# Patient Record
Sex: MALE | Race: BLACK OR AFRICAN AMERICAN | NOT HISPANIC OR LATINO | URBAN - METROPOLITAN AREA
[De-identification: names, ages, dates, MRNs, and addresses within clinical notes are randomized per-mention and may not be internally consistent; named-entity substitution may affect disease eponyms.]

---

## 2019-03-25 ENCOUNTER — EMERGENCY (EMERGENCY)
Facility: HOSPITAL | Age: 34
LOS: 0 days | Discharge: HOME | End: 2019-03-25
Payer: MEDICAID

## 2019-03-25 VITALS
SYSTOLIC BLOOD PRESSURE: 153 MMHG | HEIGHT: 70 IN | TEMPERATURE: 98 F | OXYGEN SATURATION: 99 % | DIASTOLIC BLOOD PRESSURE: 83 MMHG | WEIGHT: 220.02 LBS | HEART RATE: 75 BPM | RESPIRATION RATE: 18 BRPM

## 2019-03-25 DIAGNOSIS — M79.671 PAIN IN RIGHT FOOT: ICD-10-CM

## 2019-03-25 PROCEDURE — 99283 EMERGENCY DEPT VISIT LOW MDM: CPT

## 2019-03-25 PROCEDURE — 73620 X-RAY EXAM OF FOOT: CPT | Mod: 26,RT

## 2019-03-25 RX ORDER — IBUPROFEN 200 MG
600 TABLET ORAL ONCE
Qty: 0 | Refills: 0 | Status: COMPLETED | OUTPATIENT
Start: 2019-03-25 | End: 2019-03-25

## 2019-03-25 RX ORDER — CEPHALEXIN 500 MG
1 CAPSULE ORAL
Qty: 14 | Refills: 0
Start: 2019-03-25 | End: 2019-03-31

## 2019-03-25 RX ORDER — OXYCODONE AND ACETAMINOPHEN 5; 325 MG/1; MG/1
1 TABLET ORAL ONCE
Qty: 0 | Refills: 0 | Status: DISCONTINUED | OUTPATIENT
Start: 2019-03-25 | End: 2019-03-25

## 2019-03-25 RX ADMIN — OXYCODONE AND ACETAMINOPHEN 1 TABLET(S): 5; 325 TABLET ORAL at 16:58

## 2019-03-25 RX ADMIN — OXYCODONE AND ACETAMINOPHEN 1 TABLET(S): 5; 325 TABLET ORAL at 17:05

## 2019-03-25 NOTE — ED ADULT NURSE NOTE - OBJECTIVE STATEMENT
Patient complains of abscess to the foot that started 2 months ago. Dry skin and swelling near site, not currently red.

## 2019-03-25 NOTE — ED PROVIDER NOTE - OBJECTIVE STATEMENT
34 y/o male with no PMH here c/o R foot pain and swelling x 2 days. pt states he had a scab in the foot and then started picking at it and now its painful. took motrin with mild relief. no fevers. pt has been able to bear weight just with pain. no change in sensation. pt otherwise has no other complaints.    ROS: No fever/chills. No eye pain/changes in vision, No ear pain/sore throat/dysphagia, No chest pain/palpitations. No SOB/cough/. No abdominal pain, N/V/D, no black/bloody bm. No dysuria/frequency/discharge, No headache. No Dizziness.  +scab to foot No numbness/tingling/weakness. 32 y/o male with no PMH here c/o R foot pain and swelling x 2 days. pt states he had a scab in the foot and then started picking at it and now its painful. took motrin with mild relief. no trauma. no fevers. pt has been able to bear weight just with pain. no change in sensation. pt otherwise has no other complaints.    ROS: No fever/chills. No eye pain/changes in vision, No ear pain/sore throat/dysphagia, No chest pain/palpitations. No SOB/cough/. No abdominal pain, N/V/D, no black/bloody bm. No dysuria/frequency/discharge, No headache. No Dizziness.  +scab to foot No numbness/tingling/weakness.

## 2019-03-25 NOTE — ED PROVIDER NOTE - PHYSICAL EXAMINATION
Gen: Alert, NAD, not toxic, well appearing  Head: NC, AT, PERRL, EOMI, normal lids/conjunctiva  ENT: B TM WNL, normal hearing, patent oropharynx without erythema/exudate, uvula midline  Neck: +supple, no tenderness/meningismus/JVD, +Trachea midline  Pulm: Bilateral BS, normal resp effort, no wheeze/stridor/retractions  CV: RRR, no M/R/G, +dist pulses  Abd: soft, NT/ND, +BS, no hepatosplenomegaly  Mskel: no erythema/cyanosis, R foot without bony tenderness, full rom, sensations intact, good pulses, able to wiggle all toes  Skin: area of darkened skin R medial foot with excoriations, no erythema or warmth,  Neuro: AAOx3, no sensory/motor deficits, CN 2-12 intact

## 2019-03-25 NOTE — ED PROVIDER NOTE - CLINICAL SUMMARY MEDICAL DECISION MAKING FREE TEXT BOX
pt here with R foot pain s/p picked at a scab, no fevers, xray neg for acute pathology, advised pt re importance of podiatry fu, pt understands, pain control, abx, ok with dc pt here with R foot pain s/p picked at a scab, no fevers, xray neg for acute pathology, advised pt re importance of podiatry fu, pt understands, pain control, abx, return precatuions given ok with dc

## 2019-08-28 ENCOUNTER — EMERGENCY (EMERGENCY)
Facility: HOSPITAL | Age: 34
LOS: 0 days | Discharge: ANOTHER HOSPITAL | End: 2019-08-28
Attending: EMERGENCY MEDICINE
Payer: MEDICAID

## 2019-08-28 ENCOUNTER — TRANSCRIPTION ENCOUNTER (OUTPATIENT)
Age: 34
End: 2019-08-28

## 2019-08-28 ENCOUNTER — RESULT REVIEW (OUTPATIENT)
Age: 34
End: 2019-08-28

## 2019-08-28 ENCOUNTER — OUTPATIENT (OUTPATIENT)
Dept: EMERGENCY DEPT | Facility: HOSPITAL | Age: 34
LOS: 1 days | Discharge: HOME | End: 2019-08-28
Payer: MEDICAID

## 2019-08-28 VITALS
TEMPERATURE: 98 F | RESPIRATION RATE: 17 BRPM | HEART RATE: 50 BPM | DIASTOLIC BLOOD PRESSURE: 73 MMHG | SYSTOLIC BLOOD PRESSURE: 116 MMHG | OXYGEN SATURATION: 97 %

## 2019-08-28 VITALS
SYSTOLIC BLOOD PRESSURE: 137 MMHG | RESPIRATION RATE: 17 BRPM | HEART RATE: 58 BPM | TEMPERATURE: 98 F | OXYGEN SATURATION: 98 % | DIASTOLIC BLOOD PRESSURE: 87 MMHG

## 2019-08-28 VITALS
DIASTOLIC BLOOD PRESSURE: 73 MMHG | WEIGHT: 231.04 LBS | SYSTOLIC BLOOD PRESSURE: 127 MMHG | RESPIRATION RATE: 16 BRPM | HEIGHT: 70 IN | HEART RATE: 84 BPM | OXYGEN SATURATION: 100 % | TEMPERATURE: 98 F

## 2019-08-28 VITALS
SYSTOLIC BLOOD PRESSURE: 129 MMHG | DIASTOLIC BLOOD PRESSURE: 87 MMHG | TEMPERATURE: 98 F | RESPIRATION RATE: 18 BRPM | OXYGEN SATURATION: 98 % | HEART RATE: 69 BPM

## 2019-08-28 DIAGNOSIS — T18.128A FOOD IN ESOPHAGUS CAUSING OTHER INJURY, INITIAL ENCOUNTER: ICD-10-CM

## 2019-08-28 DIAGNOSIS — T18.108A UNSPECIFIED FOREIGN BODY IN ESOPHAGUS CAUSING OTHER INJURY, INITIAL ENCOUNTER: ICD-10-CM

## 2019-08-28 DIAGNOSIS — T17.228A FOOD IN PHARYNX CAUSING OTHER INJURY, INITIAL ENCOUNTER: ICD-10-CM

## 2019-08-28 DIAGNOSIS — Z98.890 OTHER SPECIFIED POSTPROCEDURAL STATES: Chronic | ICD-10-CM

## 2019-08-28 DIAGNOSIS — Z29.9 ENCOUNTER FOR PROPHYLACTIC MEASURES, UNSPECIFIED: ICD-10-CM
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
## 2019-08-28 LAB
ALBUMIN SERPL ELPH-MCNC: 4 G/DL — SIGNIFICANT CHANGE UP (ref 3.3–5)
ALP SERPL-CCNC: 115 U/L — SIGNIFICANT CHANGE UP (ref 40–120)
ALT FLD-CCNC: 22 U/L — SIGNIFICANT CHANGE UP (ref 12–78)
ANION GAP SERPL CALC-SCNC: 8 MMOL/L — SIGNIFICANT CHANGE UP (ref 5–17)
APTT BLD: 29.6 SEC — SIGNIFICANT CHANGE UP (ref 27.5–36.3)
AST SERPL-CCNC: 17 U/L — SIGNIFICANT CHANGE UP (ref 15–37)
BILIRUB SERPL-MCNC: 0.4 MG/DL — SIGNIFICANT CHANGE UP (ref 0.2–1.2)
BUN SERPL-MCNC: 12 MG/DL — SIGNIFICANT CHANGE UP (ref 7–23)
CALCIUM SERPL-MCNC: 9.2 MG/DL — SIGNIFICANT CHANGE UP (ref 8.5–10.1)
CHLORIDE SERPL-SCNC: 107 MMOL/L — SIGNIFICANT CHANGE UP (ref 96–108)
CO2 SERPL-SCNC: 26 MMOL/L — SIGNIFICANT CHANGE UP (ref 22–31)
CREAT SERPL-MCNC: 1.07 MG/DL — SIGNIFICANT CHANGE UP (ref 0.5–1.3)
GLUCOSE SERPL-MCNC: 114 MG/DL — HIGH (ref 70–99)
HCT VFR BLD CALC: 41.2 % — SIGNIFICANT CHANGE UP (ref 39–50)
HGB BLD-MCNC: 13.7 G/DL — SIGNIFICANT CHANGE UP (ref 13–17)
INR BLD: 1.02 RATIO — SIGNIFICANT CHANGE UP (ref 0.88–1.16)
MCHC RBC-ENTMCNC: 30.3 PG — SIGNIFICANT CHANGE UP (ref 27–34)
MCHC RBC-ENTMCNC: 33.3 GM/DL — SIGNIFICANT CHANGE UP (ref 32–36)
MCV RBC AUTO: 91.2 FL — SIGNIFICANT CHANGE UP (ref 80–100)
NRBC # BLD: 0 /100 WBCS — SIGNIFICANT CHANGE UP (ref 0–0)
PLATELET # BLD AUTO: 193 K/UL — SIGNIFICANT CHANGE UP (ref 150–400)
POTASSIUM SERPL-MCNC: 3.6 MMOL/L — SIGNIFICANT CHANGE UP (ref 3.5–5.3)
POTASSIUM SERPL-SCNC: 3.6 MMOL/L — SIGNIFICANT CHANGE UP (ref 3.5–5.3)
PROT SERPL-MCNC: 7.7 GM/DL — SIGNIFICANT CHANGE UP (ref 6–8.3)
PROTHROM AB SERPL-ACNC: 11.4 SEC — SIGNIFICANT CHANGE UP (ref 10–12.9)
RBC # BLD: 4.52 M/UL — SIGNIFICANT CHANGE UP (ref 4.2–5.8)
RBC # FLD: 14.3 % — SIGNIFICANT CHANGE UP (ref 10.3–14.5)
SODIUM SERPL-SCNC: 141 MMOL/L — SIGNIFICANT CHANGE UP (ref 135–145)
WBC # BLD: 4.73 K/UL — SIGNIFICANT CHANGE UP (ref 3.8–10.5)
WBC # FLD AUTO: 4.73 K/UL — SIGNIFICANT CHANGE UP (ref 3.8–10.5)

## 2019-08-28 PROCEDURE — 70490 CT SOFT TISSUE NECK W/O DYE: CPT | Mod: 26

## 2019-08-28 PROCEDURE — 93010 ELECTROCARDIOGRAM REPORT: CPT

## 2019-08-28 PROCEDURE — 99285 EMERGENCY DEPT VISIT HI MDM: CPT

## 2019-08-28 PROCEDURE — 71045 X-RAY EXAM CHEST 1 VIEW: CPT | Mod: 26

## 2019-08-28 PROCEDURE — 70360 X-RAY EXAM OF NECK: CPT | Mod: 26

## 2019-08-28 RX ORDER — SODIUM CHLORIDE 9 MG/ML
1000 INJECTION INTRAMUSCULAR; INTRAVENOUS; SUBCUTANEOUS ONCE
Refills: 0 | Status: COMPLETED | OUTPATIENT
Start: 2019-08-28 | End: 2019-08-28

## 2019-08-28 RX ORDER — METOCLOPRAMIDE HCL 10 MG
10 TABLET ORAL ONCE
Refills: 0 | Status: COMPLETED | OUTPATIENT
Start: 2019-08-28 | End: 2019-08-28

## 2019-08-28 RX ORDER — GLUCAGON INJECTION, SOLUTION 0.5 MG/.1ML
0.5 INJECTION, SOLUTION SUBCUTANEOUS ONCE
Refills: 0 | Status: COMPLETED | OUTPATIENT
Start: 2019-08-28 | End: 2019-08-28

## 2019-08-28 RX ORDER — ACETAMINOPHEN 500 MG
1000 TABLET ORAL ONCE
Refills: 0 | Status: DISCONTINUED | OUTPATIENT
Start: 2019-08-28 | End: 2019-08-28

## 2019-08-28 RX ORDER — MORPHINE SULFATE 50 MG/1
4 CAPSULE, EXTENDED RELEASE ORAL ONCE
Refills: 0 | Status: DISCONTINUED | OUTPATIENT
Start: 2019-08-28 | End: 2019-08-28

## 2019-08-28 RX ORDER — DIPHENHYDRAMINE HCL 50 MG
25 CAPSULE ORAL ONCE
Refills: 0 | Status: COMPLETED | OUTPATIENT
Start: 2019-08-28 | End: 2019-08-28

## 2019-08-28 RX ORDER — NITROGLYCERIN 6.5 MG
0.4 CAPSULE, EXTENDED RELEASE ORAL ONCE
Refills: 0 | Status: COMPLETED | OUTPATIENT
Start: 2019-08-28 | End: 2019-08-28

## 2019-08-28 RX ORDER — GLUCAGON INJECTION, SOLUTION 0.5 MG/.1ML
1 INJECTION, SOLUTION SUBCUTANEOUS ONCE
Refills: 0 | Status: COMPLETED | OUTPATIENT
Start: 2019-08-28 | End: 2019-08-28

## 2019-08-28 RX ADMIN — GLUCAGON INJECTION, SOLUTION 0.5 MILLIGRAM(S): 0.5 INJECTION, SOLUTION SUBCUTANEOUS at 01:06

## 2019-08-28 RX ADMIN — SODIUM CHLORIDE 1000 MILLILITER(S): 9 INJECTION INTRAMUSCULAR; INTRAVENOUS; SUBCUTANEOUS at 02:32

## 2019-08-28 RX ADMIN — Medication 10 MILLIGRAM(S): at 04:24

## 2019-08-28 RX ADMIN — SODIUM CHLORIDE 1000 MILLILITER(S): 9 INJECTION INTRAMUSCULAR; INTRAVENOUS; SUBCUTANEOUS at 01:09

## 2019-08-28 RX ADMIN — SODIUM CHLORIDE 1000 MILLILITER(S): 9 INJECTION INTRAMUSCULAR; INTRAVENOUS; SUBCUTANEOUS at 04:24

## 2019-08-28 RX ADMIN — Medication 0.4 MILLIGRAM(S): at 05:48

## 2019-08-28 RX ADMIN — MORPHINE SULFATE 4 MILLIGRAM(S): 50 CAPSULE, EXTENDED RELEASE ORAL at 03:01

## 2019-08-28 RX ADMIN — Medication 25 MILLIGRAM(S): at 05:48

## 2019-08-28 RX ADMIN — MORPHINE SULFATE 4 MILLIGRAM(S): 50 CAPSULE, EXTENDED RELEASE ORAL at 02:45

## 2019-08-28 RX ADMIN — SODIUM CHLORIDE 1000 MILLILITER(S): 9 INJECTION INTRAMUSCULAR; INTRAVENOUS; SUBCUTANEOUS at 08:07

## 2019-08-28 NOTE — DISCHARGE NOTE PROVIDER - NSDCCPCAREPLAN_GEN_ALL_CORE_FT
PRINCIPAL DISCHARGE DIAGNOSIS  Diagnosis: Esophageal foreign body  Assessment and Plan of Treatment: You came to the hospital because a piece of goat meat was stuck in your esophagus, which was visualized by imaging. Endoscopy was performed, and the food was successfully removed. You will need to arrange an appointment scheduled for esophageal dilation and a repeat upper endoscopy. Please note that it's very important for you to chew your food well and maintain a soft diet, given your history of esophageal strictures.

## 2019-08-28 NOTE — DISCHARGE NOTE PROVIDER - HOSPITAL COURSE
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 Patient denied CP, SOB, and N/V. Patient was transferred from OSH, where he received glucagon x1, for advanced GI care. In the ED, vital signs were T 98.1, HR 58, /87, RR 17, and SaO2 98% RA. He was administered reglan x1, diphenhydramine x1, nitroglycerin x1, and 2 boluses of NS. X-ray neck revealed normal soft tissue structures, and CT neck revealed dilatation of the esophagus containing undigested material. GI performed an endoscopy, revealing a food bolus in the upper third of the esophagus, which was successfully removed. Biopsies were obtained for EoE, with results pending upon discharge. Non-bleeding erosive gastropathy was also visualized. Pt was discharged in stable condition after successfully tolerating PO solid food with instructions to f/u with GI for esophageal dilation. 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 Patient denied CP, SOB, and N/V. Patient was transferred from OSH, where he received glucagon x1, for advanced GI care. In the ED, vital signs were T 98.1, HR 58, /87, RR 17, and SaO2 98% RA. He was administered reglan x1, diphenhydramine x1, nitroglycerin x1, and 2 boluses of NS. X-ray neck revealed normal soft tissue structures, and CT neck revealed dilatation of the esophagus containing undigested material. GI performed an endoscopy, revealing a food bolus in the upper third of the esophagus, which was successfully removed. Biopsies were obtained for EoE, with results pending upon discharge. Non-bleeding erosive gastropathy was also visualized. Pt was discharged in stable condition after successfully tolerating PO solid food with instructions to f/u with GI for esophageal dilation.        CT neck 8/28 no contast:    IMPRESSION:  No radiopaque foreign body identified.        Dilatation of the esophagus containing undigested material.              8/28/19 endoscopy:                                                                            Findings:         Food resembling meat was found in the upper third of the esophagus,          approximately 20 cm from the incisors. Partial removal of the impacted          food bolus was accomplished using a Raptor tooth. The remainder of the          bolus was gently pushed into the stomach.         One moderate (circumferential scarring or stenosis; an endoscope may          pass) benign-appearing, intrinsic stenosis was found approximately 20 cm          from the incisors. This area was traversed without any resistance.          Biopsies were taken with a cold forceps for histology at distal          esophagus and mid esophagus for eosinophilic esophagitis (EoE) testing.         The Z-line was regular and was found 39 cm from the incisors.         The exam of the esophagus was otherwise normal.         A few localized, small non-bleeding erosions were found in the          prepyloric region of the stomach. There were no stigmata of recent          bleeding.         The exam of the stomach was otherwise normal.         The duodenal bulb and 2nd part of the duodenum were normal.                                                                                       Impression:          - Food bolus in the upper third of the esophagus.                          Removal was successful.      - Benign-appearing esophageal stenosis.                         - Esophagus biopsied for EoE.                         - Non-bleeding erosive gastropathy.                         - Normal duodenal bulb and 2nd part of the duodenum.    Recommendation:      -Return patient to hospital [facility] for ongoing care.                         - Soft diet. Discussed importance of chewing food well.                         - Repeat the upper endoscopy at appointment to be                          scheduled for dilation.                      - Await pathology results.

## 2019-08-28 NOTE — ED ADULT NURSE NOTE - OBJECTIVE STATEMENT
Pt presents w/ complaints of food stuck in his throat. Reports that he was eating goat meat earlier and got a piece stuck in his throat. He is speaking in clear full sentences, w/ no SOB or respiratory distress.

## 2019-08-28 NOTE — H&P ADULT - ASSESSMENT
33yoM w/ hx of esophageal strictures s/p dilation presenting w/ inability to swallow after eating goat, pending GI evaluation. 33yoM w/ hx of esophageal strictures s/p dilation presenting w/ inability to swallow after eating goat, now undergoing EGD for removal of food bolus with GI.

## 2019-08-28 NOTE — ED PROVIDER NOTE - OBJECTIVE STATEMENT
33y m tx from [facility] for esophageal FB, pt was eating steak and a piece lodged in throat, pt now unable to swallow saliva. Pt is s/p glucagon at [facility] but still not tolerating any swallowing. Airway intact, breathing unlabored. Transferred for GI eval 33y m tx from [facility] for esophageal FB, pt was eating goat and a piece lodged in throat, pt now unable to swallow saliva. Pt is s/p glucagon at [facility] but still not tolerating any swallowing. Airway intact, breathing unlabored. Transferred for GI eval

## 2019-08-28 NOTE — DISCHARGE NOTE PROVIDER - CARE PROVIDER_API CALL
Marta Finnegan)  Internal Medicine  [address] [address] [place] [address]  Phone: [phone]  Fax: [phone]  Follow Up Time:

## 2019-08-28 NOTE — ED ADULT NURSE NOTE - CHIEF COMPLAINT QUOTE
Pt arrives as transfer from [facility] accepted by Attending Binu Gomez.  Pt seen at [facility] for difficulty swallowing saliva related to known esophageal strictures since childhood.  Pt was eating steak and food became stuck.  18g iv placed to [facility] by [facility] [facility].  Pt received 1 Liter NS and glucagon IVP.  Pt spitting saliva into a bag.

## 2019-08-28 NOTE — ED PROVIDER NOTE - PROGRESS NOTE DETAILS
no improvement with meds, can't tolerate PO intake or oral secretions  will transfer to [facility] if accepted, pt. is agreeable to transfer  on phone with transfer center now

## 2019-08-28 NOTE — CONSULT NOTE ADULT - ASSESSMENT
Impression:  1) Food impaction- Differential diagnosis includes esophagitis (EOE, esophageal ring), achalasia, peptic stricture, esophageal/cardia malignancy    Recommendations:  -Keep NPO for EGD today  -Aspiration precautions

## 2019-08-28 NOTE — H&P ADULT - NSHPREVIEWOFSYSTEMS_GEN_ALL_CORE

## 2019-08-28 NOTE — ED ADULT TRIAGE NOTE - CHIEF COMPLAINT QUOTE
Pt arrives as transfer from [facility] accepted by Attending Binu Gomez.  Pt seen at [facility] for difficulty swallowing saliva related to known esophageal strictures since childhood.  Pt was eating steak and food became stuck.  18g iv placed to [facility] by [facility].  Pt received 1 Liter NS and glucagon IVP.  Pt spitting saliva into a bag.

## 2019-08-28 NOTE — ED PROVIDER NOTE - ATTENDING CONTRIBUTION TO CARE
HPI: 34 yo M with esophageal strictures from accidental swallowing bleach as child that was transferred from [facility] for complaints of esophageal FB. pt reports tonight was eating a piece of gaot when he felt something stuck in his throat, he attempted to push it farther down into his esophagus, unable to swallow so he presented to hospital. Pt reports no diff breathing. He has had this before, most recent was 3-4 years ago and was admitted to [facility] and had dilatation of his esophagus and has been normal since but tonight was hungry so he ate fast without chewing and that is why this occurred. + smoker but otherwise no drinking/drugs and no chest pain, SOB, fever, chills, N/V/D.   EXAM: NAD, speaking full sentences, oropharynx normal, no FB seen, heart RRR, lungs ctab, abd soft nontender, gait normal.   MDM: concern for esophageal FB, unable to visualize. GI aware and accepted transfer to [facility]. Pt speaking well and no airway compromise. Will consult GI. pt had labs at OSH, normal. Will redose some meds. Pt received glucagon at OSH but refused here as he didn't like how it made him feel. Will provide reglan.

## 2019-08-28 NOTE — H&P ADULT - ATTENDING COMMENTS
Pt seen and examined.  Imaging reviewed, h/o alberto palumbo noted.  Spoke with GI, plan for EGD now.

## 2019-08-28 NOTE — CONSULT NOTE ADULT - SUBJECTIVE AND OBJECTIVE BOX
Chief Complaint:  Patient is a 33y old  Male who presents with a chief complaint of food impaction (28 Aug 2019 09:58)      HPI:  34yo M no PMH presents with food bolus in esophagus. Patient states that he ate a piece of goat meat at around 12am, and since then has had food bolus sensation in his throat. Pt states that when he was younger, he ingested chloric acid and since then has always had 'problems' with swallowing. Pt reportedly had a similar episode 5-6 years ago requiring esophageal dilation at [place]. Pt has been trying to vomit up the food bolus but only spitted out saliva. Pt denies nausea, vomiting, retching, fever, chills, odynophagia, chest pain, abdominal pain.       Allergies:  No Known Allergies      Home Medications:    Hospital Medications:      PMHX/PSHX:      Family history:      There is no family history of peptic ulcer disease, gastric cancer, colon polyps, colon cancer, celiac disease, biliary, hepatic, or pancreatic disease.  None of the female relatives have breast, uterine, or ovarian cancer.     Social History:     ROS:     General:  No wt loss, fevers, chills, night sweats, fatigue,   Eyes:  Good vision, no reported pain  ENT:  No sore throat, pain, runny nose, dysphagia  CV:  No pain, palpitations, hypo/hypertension  Resp:  No dyspnea, cough, tachypnea, wheezing  GI:  See HPI   :  No pain, bleeding, incontinence, nocturia  Muscle:  No pain, weakness  Neuro:  No weakness, tingling, memory problems  Psych:  No fatigue, insomnia, mood problems, depression  Endocrine:  No polyuria, polydipsia, cold/heat intolerance  Heme:  No petechiae, ecchymosis, easy bruisability  Skin:  No rash, tattoos, scars, edema      PHYSICAL EXAM:     GENERAL:  Appears stated age, well-groomed  HEENT:  NC/AT,  conjunctivae clear and pink  CHEST:  Full & symmetric excursion, no increased effort, breath sounds clear  HEART:  Regular rhythm, S1, S2, no murmur/rub/S3/S4, no abdominal bruit, no edema  ABDOMEN:  Soft, non-tender, non-distended, normoactive bowel sounds  EXTEREMITIES:  no cyanosis,clubbing or edema  SKIN:  No rash/erythema/ecchymoses  NEURO:  Alert, oriented, no asterixis    Vital Signs:  Vital Signs Last 24 Hrs  T(C): 36.6 (28 Aug 2019 10:33), Max: 36.9 (28 Aug 2019 00:37)  T(F): 97.8 (28 Aug 2019 10:33), Max: 98.5 (28 Aug 2019 00:37)  HR: 50 (28 Aug 2019 10:33) (50 - 84)  BP: 116/73 (28 Aug 2019 10:33) (116/73 - 137/87)  BP(mean): --  RR: 17 (28 Aug 2019 10:33) (16 - 18)  SpO2: 97% (28 Aug 2019 10:33) (97% - 100%)  Daily Height in cm: 177.8 (28 Aug 2019 09:54)    Daily     LABS:                        13.7   4.73  )-----------( 193      ( 28 Aug 2019 01:15 )             41.2     Mean Cell Volume: 91.2 fl (08-28-19 @ 01:15)    08-28    141  |  107  |  12  ----------------------------<  114<H>  3.6   |  26  |  1.07    Ca    9.2      28 Aug 2019 01:15    TPro  7.7  /  Alb  4.0  /  TBili  0.4  /  DBili  x   /  AST  17  /  ALT  22  /  AlkPhos  115  08-28    LIVER FUNCTIONS - ( 28 Aug 2019 01:15 )  Alb: 4.0 g/dL / Pro: 7.7 gm/dL / ALK PHOS: 115 U/L / ALT: 22 U/L / AST: 17 U/L / GGT: x           PT/INR - ( 28 Aug 2019 01:15 )   PT: 11.4 sec;   INR: 1.02 ratio         PTT - ( 28 Aug 2019 01:15 )  PTT:29.6 sec                            13.7   4.73  )-----------( 193      ( 28 Aug 2019 01:15 )             41.2     Imaging:    < from: CT Neck Soft Tissue No Cont (08.28.19 @ 09:32) >  FINDINGS:  AERODIGESTIVE TRACT:  There is dilatation of the upper to mid esophagus   which contains undigested material in keeping with patient's history of   esophageal strictures. The remaining aerodigestive tract has a normal   noncontrast appearance. No radiopaque foreign body is identified.    LYMPH NODES:  No adenopathy is seen although detection is hampered by   lack of contrast.    PAROTID GLANDS:  Normal non-contrast appearance.    SUBMANDIBULAR GLANDS:  Normal non-contrast appearance.    THYROID GLAND:  Normal non-contrast appearance.    VISUALIZED PARANASAL SINUSES:  Small right maxillary polyp or retention   cyst.    VISUALIZED TYMPANOMASTOIDCAVITIES: Clear.    BONES:  Normal.    MISCELLANEOUS:  None.      IMPRESSION:  No radiopaque foreign body identified.    Dilatation of the esophagus containing undigested material.      < end of copied text >

## 2019-08-28 NOTE — ED PROVIDER NOTE - PHYSICAL EXAMINATION
Vitals: WNL  Gen: AAOx3, NAD, sitting up uncomfortably in stretcher, coughing into basin at bedside--difficulty managing secretions   Head: ncat, perrla, eomi b/l  Neck: supple, no lymphadenopathy, no midline deviation  Heart: rrr, no m/r/g  Lungs: CTA b/l, no rales/ronchi/wheezes  Abd: soft, nontender, non-distended, no rebound or guarding  Ext: no clubbing/cyanosis/edema  Neuro: sensation and muscle strength intact b/l, steady gait

## 2019-08-28 NOTE — ED PROVIDER NOTE - OBJECTIVE STATEMENT
32 yo M with esophageal FB.  Pt. says he was eating steak, it got stuck, and now he can't swallow.  He can't manage his spit--he has to spit out because he can't swallow.  Pt. notes this has happened before--he has esophageal strictures.  No other complaints or inciting event.    ROS: negative for fever, cough, headache, chest pain, shortness of breath, abd pain, nausea, vomiting, diarrhea, rash, paresthesia, and weakness--all other systems reviewed are negative.   PMH: esophageal strictures s/p dilation (accidental acid ingestion as a child in [place]); Meds: Denies; SH: Denies smoking/drinking/drug use

## 2019-08-28 NOTE — ED ADULT NURSE REASSESSMENT NOTE - NS ED NURSE REASSESS COMMENT FT1
pt stable, no acute distress noted.  pt able to talk in full sentences, unable to swallow saliva.  lungs clear bilaterally on ascultation

## 2019-08-28 NOTE — H&P ADULT - HISTORY OF PRESENT ILLNESS
33y m tx from [facility] for esophageal FB, pt was eating goat and a piece lodged in throat, pt now unable to swallow saliva. Pt is s/p glucagon at [facility] but still not tolerating any swallowing. Airway intact, breathing unlabored. Transferred for GI eval 34 yo M w/ hx of esophageal strictures 2/2 accidental ingestion of chloric acid as a child s/p esophageal dilatation in 2004 presenting w/ inability to swallow after eating goat. Patient states that he ate a piece of goat meat at around 12am, and since then has had food bolus sensation in his throat. He attempted external manual manipulation but was unsuccessful. Pt states that when he was younger, he ingested chloric acid and since then has always had 'problems' with swallowing. Pt reportedly had esophageal dilation performed in [place] in 2004, and since then he has had improvement in symptoms. Sometimes he requires manual manipulation, but he denies previous episodes of food impaction. Pt has been attempting to regurgitate the food bolus but is only spitting out saliva. Patient denies CP, SOB, and N/V. Patient initially present to OSH, where he received glucagon x1. He was subsequently transferred to [facility] for further GI care.    In the [facility] ED, vital signs were T 98.1, HR 58, /87, RR 17, and SaO2 98% RA. He was administered reglan x1, diphenhydramine x1, nitroglycerin x1, and 2 boluses of NS. X-ray neck revealed normal soft tissue structures. CT neck revealed dilatation of the esophagus containing undigested material.

## 2019-08-28 NOTE — DISCHARGE NOTE NURSING/CASE MANAGEMENT/SOCIAL WORK - PATIENT PORTAL LINK FT
You can access the FollowMyHealth Patient Portal offered by [facility] by registering at the following website: [email or web address]. By joining [facility]’s FollowMyHealth portal, you will also be able to view your health information using other applications (apps) compatible with our system.

## 2019-08-28 NOTE — H&P ADULT - PROBLEM SELECTOR PLAN 1
- most likely 2/2 to known esophageal strictures 2/2 bleach ingestion as child  - ct neck shows esophageal dilatation with undigested food   - removal of food bolus via endoscopy   - GI recs appreciated

## 2019-08-28 NOTE — ED PROVIDER NOTE - PROGRESS NOTE DETAILS
Nikkie, pgy3: if FB above upper esophageal sphincter, will be scoped by ENT, if below will be scoped by GI. Cont to reassess [place]: CT pending but GI recommends admission for likely endoscopy.

## 2019-08-28 NOTE — PATIENT PROFILE ADULT - BILL PAYMENT
Notified NP of pt worsening mentation, pt found in room unable to respond to commands or answer questions, pt put into bed and bed alarm turned on.   no

## 2019-08-28 NOTE — CONSULT NOTE ADULT - ATTENDING COMMENTS
Patient p/w food impaction 2/2 steak. Remote history of dysphagia 2/2 esophageal stricture as child after HCl ingestion s/p dilation x 1 many years ago. No recent dysphagia symptoms prior to last night. Reports did not chew well while playing video games. Patient p/w food impaction 2/2 steak. Remote history of dysphagia 2/2 esophageal stricture as child after HCl ingestion s/p dilation x 1 many years ago. No recent dysphagia symptoms prior to last night. Reports did not chew well while playing video games. Plan for EGD today.

## 2019-08-28 NOTE — H&P ADULT - NSHPPHYSICALEXAM_GEN_ALL_CORE
PHYSICAL EXAM:  GENERAL: NAD, well-groomed, well-developed  HEAD:  Atraumatic, Normocephalic  EYES: EOMI, PERRLA, conjunctiva and sclera clear  ENMT: No tonsillar erythema, exudates, or enlargement; Moist mucous membranes, Good dentition, No lesions  NECK: Supple, No JVD, Normal thyroid  CHEST/LUNG: Clear to percussion bilaterally; No rales, rhonchi, wheezing, or rubs  HEART: Regular rate and rhythm; No murmurs, rubs, or gallops  ABDOMEN: Soft, Nontender, Nondistended; Bowel sounds present  VASCULAR:  2+ Peripheral Pulses, No clubbing, cyanosis, or edema  LYMPH: No lymphadenopathy noted  SKIN: No rashes or lesions  NERVOUS SYSTEM:  Alert & Oriented X3, Good concentration; Motor Strength 5/5 B/L upper and lower extremities; DTRs 2+ intact and symmetric

## 2019-08-28 NOTE — ED ADULT NURSE NOTE - OBJECTIVE STATEMENT
pt a&o x3 arrives as a transfer for esophageal FB. states at 1200pm he was eating steak and a piece lodged in his throat. pt reports constant need and feeling to spit. unable to tolerate swallowing but tolerating room air with no respiratory distress. md assessed. arrived with left ac 18g in place from prior facility. safety maintained

## 2019-08-28 NOTE — ED PROVIDER NOTE - CLINICAL SUMMARY MEDICAL DECISION MAKING FREE TEXT BOX
33y m tx from [facility] for esophageal FB, pt was eating steak and a piece lodged in throat, pt now unable to swallow saliva. Pt is s/p glucagon at [facility] but still not tolerating any swallowing. Airway intact, breathing unlabored. Transferred for GI eval. Pt either tba for GI scope inpatient or to have GI scope in ER for FB removal vs ENT intervention depending on level of FB. Will obtain CT to eval level of FB

## 2019-08-28 NOTE — ED PROVIDER NOTE - CLINICAL SUMMARY MEDICAL DECISION MAKING FREE TEXT BOX
32 yo M with likely esophageal FB 2/2 esophageal strictures  -basic labs, coags, cxr, XR neck, iv, glucagon, ns hydration, ekg  -attempt swallow after meds, if no improvement will transfer for GI

## 2019-08-28 NOTE — DISCHARGE NOTE PROVIDER - NSFOLLOWUPCLINICS_GEN_ALL_ED_FT
[facility]  Gastroenterology  [address] [place] [address]  Phone: [phone]  Fax:   Follow Up Time:

## 2019-08-28 NOTE — ED ADULT TRIAGE NOTE - CHIEF COMPLAINT QUOTE
Patient reports: I ate meat and it got stuck. I can't even swallow my saliva." Good phonation. + gagging. respirations even non-labored

## 2019-08-28 NOTE — H&P ADULT - NSHPLABSRESULTS_GEN_ALL_CORE
13.7   4.73  )-----------( 193      ( 28 Aug 2019 01:15 )             41.2       08-28    141  |  107  |  12  ----------------------------<  114<H>  3.6   |  26  |  1.07    Ca    9.2      28 Aug 2019 01:15    TPro  7.7  /  Alb  4.0  /  TBili  0.4  /  DBili  x   /  AST  17  /  ALT  22  /  AlkPhos  115  08-28                  PT/INR - ( 28 Aug 2019 01:15 )   PT: 11.4 sec;   INR: 1.02 ratio         PTT - ( 28 Aug 2019 01:15 )  PTT:29.6 sec    Lactate Trend            CAPILLARY BLOOD GLUCOSE 13.7   4.73  )-----------( 193      ( 28 Aug 2019 01:15 )             41.2       08-28    141  |  107  |  12  ----------------------------<  114<H>  3.6   |  26  |  1.07    Ca    9.2      28 Aug 2019 01:15    TPro  7.7  /  Alb  4.0  /  TBili  0.4  /  DBili  x   /  AST  17  /  ALT  22  /  AlkPhos  115  08-28          PT/INR - ( 28 Aug 2019 01:15 )   PT: 11.4 sec;   INR: 1.02 ratio         PTT - ( 28 Aug 2019 01:15 )  PTT:29.6 sec      < from: CT Neck Soft Tissue No Cont (08.28.19 @ 09:32) >      IMPRESSION:  No radiopaque foreign body identified.    Dilatation of the esophagus containing undigested material.    < end of copied text >    < from: Xray Neck Soft Tissue (08.28.19 @ 01:13) >    IMPRESSION:    Normal examination of the neck soft tissues.    < end of copied text >

## 2019-08-30 LAB — SURGICAL PATHOLOGY STUDY: SIGNIFICANT CHANGE UP

## 2019-09-10 PROBLEM — K22.2 ESOPHAGEAL OBSTRUCTION: Chronic | Status: ACTIVE | Noted: 2019-08-28

## 2019-09-10 PROBLEM — Z00.00 ENCOUNTER FOR PREVENTIVE HEALTH EXAMINATION: Status: ACTIVE | Noted: 2019-09-10

## 2019-10-31 ENCOUNTER — APPOINTMENT (OUTPATIENT)
Dept: GASTROENTEROLOGY | Facility: CLINIC | Age: 34
End: 2019-10-31

## 2020-01-28 ENCOUNTER — EMERGENCY (EMERGENCY)
Facility: HOSPITAL | Age: 35
LOS: 0 days | Discharge: HOME | End: 2020-01-29
Attending: EMERGENCY MEDICINE
Payer: MEDICAID

## 2020-01-28 VITALS
HEART RATE: 63 BPM | DIASTOLIC BLOOD PRESSURE: 85 MMHG | SYSTOLIC BLOOD PRESSURE: 130 MMHG | RESPIRATION RATE: 16 BRPM | OXYGEN SATURATION: 99 % | TEMPERATURE: 98 F

## 2020-01-28 VITALS
SYSTOLIC BLOOD PRESSURE: 137 MMHG | RESPIRATION RATE: 15 BRPM | TEMPERATURE: 98 F | OXYGEN SATURATION: 98 % | HEART RATE: 68 BPM | WEIGHT: 229.94 LBS | HEIGHT: 71 IN | DIASTOLIC BLOOD PRESSURE: 86 MMHG

## 2020-01-28 DIAGNOSIS — R10.9 UNSPECIFIED ABDOMINAL PAIN: ICD-10-CM

## 2020-01-28 DIAGNOSIS — I10 ESSENTIAL (PRIMARY) HYPERTENSION: ICD-10-CM

## 2020-01-28 DIAGNOSIS — K42.9 UMBILICAL HERNIA WITHOUT OBSTRUCTION OR GANGRENE: ICD-10-CM

## 2020-01-28 DIAGNOSIS — Z98.890 OTHER SPECIFIED POSTPROCEDURAL STATES: Chronic | ICD-10-CM

## 2020-01-28 LAB
ALBUMIN SERPL ELPH-MCNC: 3.8 G/DL — SIGNIFICANT CHANGE UP (ref 3.3–5)
ALP SERPL-CCNC: 105 U/L — SIGNIFICANT CHANGE UP (ref 40–120)
ALT FLD-CCNC: 31 U/L — SIGNIFICANT CHANGE UP (ref 12–78)
ANION GAP SERPL CALC-SCNC: 5 MMOL/L — SIGNIFICANT CHANGE UP (ref 5–17)
AST SERPL-CCNC: 20 U/L — SIGNIFICANT CHANGE UP (ref 15–37)
BASOPHILS # BLD AUTO: 0.05 K/UL — SIGNIFICANT CHANGE UP (ref 0–0.2)
BASOPHILS NFR BLD AUTO: 0.8 % — SIGNIFICANT CHANGE UP (ref 0–2)
BILIRUB SERPL-MCNC: 0.6 MG/DL — SIGNIFICANT CHANGE UP (ref 0.2–1.2)
BUN SERPL-MCNC: 11 MG/DL — SIGNIFICANT CHANGE UP (ref 7–23)
CALCIUM SERPL-MCNC: 9.2 MG/DL — SIGNIFICANT CHANGE UP (ref 8.5–10.1)
CHLORIDE SERPL-SCNC: 105 MMOL/L — SIGNIFICANT CHANGE UP (ref 96–108)
CO2 SERPL-SCNC: 29 MMOL/L — SIGNIFICANT CHANGE UP (ref 22–31)
CREAT SERPL-MCNC: 0.95 MG/DL — SIGNIFICANT CHANGE UP (ref 0.5–1.3)
EOSINOPHIL # BLD AUTO: 0.17 K/UL — SIGNIFICANT CHANGE UP (ref 0–0.5)
EOSINOPHIL NFR BLD AUTO: 2.9 % — SIGNIFICANT CHANGE UP (ref 0–6)
GLUCOSE SERPL-MCNC: 92 MG/DL — SIGNIFICANT CHANGE UP (ref 70–99)
HCT VFR BLD CALC: 41.6 % — SIGNIFICANT CHANGE UP (ref 39–50)
HGB BLD-MCNC: 13.7 G/DL — SIGNIFICANT CHANGE UP (ref 13–17)
IMM GRANULOCYTES NFR BLD AUTO: 0.3 % — SIGNIFICANT CHANGE UP (ref 0–1.5)
LIDOCAIN IGE QN: 35 U/L — LOW (ref 73–393)
LYMPHOCYTES # BLD AUTO: 2.32 K/UL — SIGNIFICANT CHANGE UP (ref 1–3.3)
LYMPHOCYTES # BLD AUTO: 38.9 % — SIGNIFICANT CHANGE UP (ref 13–44)
MCHC RBC-ENTMCNC: 30 PG — SIGNIFICANT CHANGE UP (ref 27–34)
MCHC RBC-ENTMCNC: 32.9 GM/DL — SIGNIFICANT CHANGE UP (ref 32–36)
MCV RBC AUTO: 91.2 FL — SIGNIFICANT CHANGE UP (ref 80–100)
MONOCYTES # BLD AUTO: 0.49 K/UL — SIGNIFICANT CHANGE UP (ref 0–0.9)
MONOCYTES NFR BLD AUTO: 8.2 % — SIGNIFICANT CHANGE UP (ref 2–14)
NEUTROPHILS # BLD AUTO: 2.91 K/UL — SIGNIFICANT CHANGE UP (ref 1.8–7.4)
NEUTROPHILS NFR BLD AUTO: 48.9 % — SIGNIFICANT CHANGE UP (ref 43–77)
NRBC # BLD: 0 /100 WBCS — SIGNIFICANT CHANGE UP (ref 0–0)
PLATELET # BLD AUTO: 202 K/UL — SIGNIFICANT CHANGE UP (ref 150–400)
POTASSIUM SERPL-MCNC: 4.3 MMOL/L — SIGNIFICANT CHANGE UP (ref 3.5–5.3)
POTASSIUM SERPL-SCNC: 4.3 MMOL/L — SIGNIFICANT CHANGE UP (ref 3.5–5.3)
PROT SERPL-MCNC: 7.6 GM/DL — SIGNIFICANT CHANGE UP (ref 6–8.3)
RBC # BLD: 4.56 M/UL — SIGNIFICANT CHANGE UP (ref 4.2–5.8)
RBC # FLD: 14.2 % — SIGNIFICANT CHANGE UP (ref 10.3–14.5)
SODIUM SERPL-SCNC: 139 MMOL/L — SIGNIFICANT CHANGE UP (ref 135–145)
WBC # BLD: 5.96 K/UL — SIGNIFICANT CHANGE UP (ref 3.8–10.5)
WBC # FLD AUTO: 5.96 K/UL — SIGNIFICANT CHANGE UP (ref 3.8–10.5)

## 2020-01-28 PROCEDURE — 74177 CT ABD & PELVIS W/CONTRAST: CPT | Mod: 26

## 2020-01-28 PROCEDURE — 99284 EMERGENCY DEPT VISIT MOD MDM: CPT

## 2020-01-28 RX ORDER — SODIUM CHLORIDE 9 MG/ML
1000 INJECTION INTRAMUSCULAR; INTRAVENOUS; SUBCUTANEOUS ONCE
Refills: 0 | Status: COMPLETED | OUTPATIENT
Start: 2020-01-28 | End: 2020-01-28

## 2020-01-28 RX ORDER — MORPHINE SULFATE 50 MG/1
4 CAPSULE, EXTENDED RELEASE ORAL ONCE
Refills: 0 | Status: DISCONTINUED | OUTPATIENT
Start: 2020-01-28 | End: 2020-01-28

## 2020-01-28 RX ADMIN — MORPHINE SULFATE 4 MILLIGRAM(S): 50 CAPSULE, EXTENDED RELEASE ORAL at 23:57

## 2020-01-28 RX ADMIN — SODIUM CHLORIDE 1000 MILLILITER(S): 9 INJECTION INTRAMUSCULAR; INTRAVENOUS; SUBCUTANEOUS at 21:52

## 2020-01-28 RX ADMIN — MORPHINE SULFATE 4 MILLIGRAM(S): 50 CAPSULE, EXTENDED RELEASE ORAL at 21:53

## 2020-01-28 NOTE — ED PROVIDER NOTE - CLINICAL SUMMARY MEDICAL DECISION MAKING FREE TEXT BOX
labs and diagnostic imaging results reviewed with patient; PMD or clinic follow up recommended for reassessment. Patient is aware of signs/symptoms to return to the emergency department.

## 2020-01-28 NOTE — ED PROVIDER NOTE - CARE PROVIDER_API CALL
Shaw Rodriguez)  Surgery  [address] [place] [address] [address]  [place] [address]  Phone: [phone]  Fax: [phone]  Follow Up Time:

## 2020-01-28 NOTE — ED ADULT NURSE NOTE - NSIMPLEMENTINTERV_GEN_ALL_ED
Implemented All Universal Safety Interventions:  [place] to call system. Call bell, personal items and telephone within reach. Instruct patient to call for assistance. Room bathroom lighting operational. Non-slip footwear when patient is off stretcher. Physically safe environment: no spills, clutter or unnecessary equipment. Stretcher in lowest position, wheels locked, appropriate side rails in place.

## 2020-01-28 NOTE — ED PROVIDER NOTE - PATIENT PORTAL LINK FT
You can access the FollowMyHealth Patient Portal offered by [facility] by registering at the following website: [email or web address]. By joining [employer]’s FollowMyHealth portal, you will also be able to view your health information using other applications (apps) compatible with our system.

## 2020-01-28 NOTE — ED PROVIDER NOTE - NSFOLLOWUPCLINICS_GEN_ALL_ED_FT
[facility]  General Surgery  [address] [place] [address] - 3rd [place] [address]  Phone: [phone]  Fax:   Follow Up Time:

## 2020-01-28 NOTE — ED ADULT NURSE NOTE - OBJECTIVE STATEMENT
ao x3, reports woke up with pain around his navel with nausea, feels like navel pushing out.labs pending ,ivf running

## 2020-01-29 RX ADMIN — MORPHINE SULFATE 4 MILLIGRAM(S): 50 CAPSULE, EXTENDED RELEASE ORAL at 00:27

## 2020-01-30 ENCOUNTER — EMERGENCY (EMERGENCY)
Facility: HOSPITAL | Age: 35
LOS: 0 days | Discharge: HOME | End: 2020-01-30
Attending: EMERGENCY MEDICINE
Payer: MEDICAID

## 2020-01-30 VITALS
HEIGHT: 71 IN | OXYGEN SATURATION: 95 % | RESPIRATION RATE: 19 BRPM | WEIGHT: 240.08 LBS | SYSTOLIC BLOOD PRESSURE: 150 MMHG | HEART RATE: 78 BPM | TEMPERATURE: 98 F | DIASTOLIC BLOOD PRESSURE: 89 MMHG

## 2020-01-30 VITALS
RESPIRATION RATE: 18 BRPM | HEART RATE: 59 BPM | DIASTOLIC BLOOD PRESSURE: 78 MMHG | OXYGEN SATURATION: 98 % | SYSTOLIC BLOOD PRESSURE: 130 MMHG

## 2020-01-30 DIAGNOSIS — I10 ESSENTIAL (PRIMARY) HYPERTENSION: ICD-10-CM

## 2020-01-30 DIAGNOSIS — R10.9 UNSPECIFIED ABDOMINAL PAIN: ICD-10-CM

## 2020-01-30 DIAGNOSIS — Z98.890 OTHER SPECIFIED POSTPROCEDURAL STATES: Chronic | ICD-10-CM

## 2020-01-30 DIAGNOSIS — K42.9 UMBILICAL HERNIA WITHOUT OBSTRUCTION OR GANGRENE: ICD-10-CM

## 2020-01-30 DIAGNOSIS — Z98.890 OTHER SPECIFIED POSTPROCEDURAL STATES: ICD-10-CM

## 2020-01-30 LAB
APPEARANCE UR: CLEAR — SIGNIFICANT CHANGE UP
BACTERIA # UR AUTO: ABNORMAL
BILIRUB UR-MCNC: NEGATIVE — SIGNIFICANT CHANGE UP
COLOR SPEC: YELLOW — SIGNIFICANT CHANGE UP
DIFF PNL FLD: ABNORMAL
EPI CELLS # UR: SIGNIFICANT CHANGE UP
GLUCOSE UR QL: NEGATIVE MG/DL — SIGNIFICANT CHANGE UP
KETONES UR-MCNC: NEGATIVE — SIGNIFICANT CHANGE UP
LEUKOCYTE ESTERASE UR-ACNC: NEGATIVE — SIGNIFICANT CHANGE UP
NITRITE UR-MCNC: NEGATIVE — SIGNIFICANT CHANGE UP
PH UR: 5 — SIGNIFICANT CHANGE UP (ref 5–8)
PROT UR-MCNC: NEGATIVE MG/DL — SIGNIFICANT CHANGE UP
RBC CASTS # UR COMP ASSIST: SIGNIFICANT CHANGE UP /HPF (ref 0–4)
SP GR SPEC: 1.02 — SIGNIFICANT CHANGE UP (ref 1.01–1.02)
UROBILINOGEN FLD QL: NEGATIVE MG/DL — SIGNIFICANT CHANGE UP
WBC UR QL: SIGNIFICANT CHANGE UP

## 2020-01-30 PROCEDURE — 74176 CT ABD & PELVIS W/O CONTRAST: CPT | Mod: 26

## 2020-01-30 PROCEDURE — 99285 EMERGENCY DEPT VISIT HI MDM: CPT

## 2020-01-30 RX ORDER — HYDROMORPHONE HYDROCHLORIDE 2 MG/ML
1 INJECTION INTRAMUSCULAR; INTRAVENOUS; SUBCUTANEOUS ONCE
Refills: 0 | Status: DISCONTINUED | OUTPATIENT
Start: 2020-01-30 | End: 2020-01-30

## 2020-01-30 RX ORDER — TRAMADOL HYDROCHLORIDE 50 MG/1
1 TABLET ORAL
Qty: 15 | Refills: 0
Start: 2020-01-30 | End: 2020-02-03

## 2020-01-30 RX ORDER — OXYCODONE AND ACETAMINOPHEN 5; 325 MG/1; MG/1
1 TABLET ORAL ONCE
Refills: 0 | Status: DISCONTINUED | OUTPATIENT
Start: 2020-01-30 | End: 2020-01-30

## 2020-01-30 RX ADMIN — OXYCODONE AND ACETAMINOPHEN 1 TABLET(S): 5; 325 TABLET ORAL at 12:53

## 2020-01-30 RX ADMIN — HYDROMORPHONE HYDROCHLORIDE 1 MILLIGRAM(S): 2 INJECTION INTRAMUSCULAR; INTRAVENOUS; SUBCUTANEOUS at 14:59

## 2020-01-30 NOTE — ED ADULT TRIAGE NOTE - CHIEF COMPLAINT QUOTE
patient c/o of abdominal pain radiating in the L inguinal aria , patient has a history of hernia , c/o of urinary retention also patient also stated " My esophagus it is  closing and I can not breath " c/o of difficulty breathing and difficulty swallowing,

## 2020-01-30 NOTE — ED PROVIDER NOTE - CARE PROVIDER_API CALL
Giuseppe Case)  Surgery  [address], [address] [place] [address]  Phone: [phone]  Fax: [phone]  Follow Up Time:

## 2020-01-30 NOTE — ED PROVIDER NOTE - OBJECTIVE STATEMENT
33 y/o male w/PMH of HTN, hx umbilical hernia presents to the ED today for abdominal pain radiating to the L umbilical hernia. Pt reports he feels like he has to urinate, tries to urinate and it just drips. Denies dysuria, hematuria or V/D. Reports the symptoms are the same as previous visit x2 days ago but worsened and could not make it to his f/u appointment. Tried to have BM this morning but nothing came out. States yesterday is last time he passed gas. +Marijuana use, denies tobacco/EtOH/illicit substance

## 2020-01-30 NOTE — ED PROVIDER NOTE - PHYSICAL EXAMINATION
Gen: Alert, NAD  Head: NC, AT   Eyes: PERRL, EOMI, normal lids/conjunctiva  ENT: normal hearing, patent oropharynx without erythema/exudate, uvula midline  Neck: supple, no tenderness, Trachea midline  Pulm: Bilateral BS, normal resp effort, no wheeze/stridor/retractions  CV: RRR, no M/R/G, 2+ radial and dp pulses bl, no edema  Abd: soft, umbilical hernia, difficult to reduce, painful to palpation, LLQ tenderness  Mskel: extremities x4 with normal ROM and no joint effusions. no ctl spine ttp.   Skin: no rash, no bruising   Neuro: AAOx3, no sensory/motor deficits, CN 2-12 intact

## 2020-01-30 NOTE — ED PROVIDER NOTE - CLINICAL SUMMARY MEDICAL DECISION MAKING FREE TEXT BOX
r/o UTI , r/o renal stone, will repeat CT scan and give pain medications r/o UTI , r/o renal stone, will repeat CT scan and give pain medications ct shows stable umbilical hernia. ua does not show infection. labs done recently, not repeated. after meds pt tolerates po and is no longer tender. dc home with surgical follow up

## 2020-01-30 NOTE — ED PROVIDER NOTE - PATIENT PORTAL LINK FT
You can access the FollowMyHealth Patient Portal offered by [facility] by registering at the following website: [email or web address]. By joining [employer]’s FollowMyHealth portal, you will also be able to view your health information using other applications (apps) compatible with our system.

## 2020-01-31 LAB
CULTURE RESULTS: NO GROWTH — SIGNIFICANT CHANGE UP
SPECIMEN SOURCE: SIGNIFICANT CHANGE UP

## 2020-04-05 ENCOUNTER — EMERGENCY (EMERGENCY)
Facility: HOSPITAL | Age: 35
LOS: 0 days | Discharge: HOME | End: 2020-04-06
Attending: EMERGENCY MEDICINE
Payer: MEDICAID

## 2020-04-05 VITALS
TEMPERATURE: 99 F | DIASTOLIC BLOOD PRESSURE: 74 MMHG | HEART RATE: 95 BPM | WEIGHT: 229.06 LBS | SYSTOLIC BLOOD PRESSURE: 143 MMHG | OXYGEN SATURATION: 97 % | HEIGHT: 71 IN | RESPIRATION RATE: 18 BRPM

## 2020-04-05 DIAGNOSIS — I26.99 OTHER PULMONARY EMBOLISM WITHOUT ACUTE COR PULMONALE: ICD-10-CM

## 2020-04-05 DIAGNOSIS — R10.10 UPPER ABDOMINAL PAIN, UNSPECIFIED: ICD-10-CM

## 2020-04-05 DIAGNOSIS — Z98.890 OTHER SPECIFIED POSTPROCEDURAL STATES: ICD-10-CM

## 2020-04-05 DIAGNOSIS — I10 ESSENTIAL (PRIMARY) HYPERTENSION: ICD-10-CM

## 2020-04-05 DIAGNOSIS — R07.9 CHEST PAIN, UNSPECIFIED: ICD-10-CM

## 2020-04-05 DIAGNOSIS — Z98.890 OTHER SPECIFIED POSTPROCEDURAL STATES: Chronic | ICD-10-CM

## 2020-04-05 PROCEDURE — 99285 EMERGENCY DEPT VISIT HI MDM: CPT

## 2020-04-05 NOTE — ED ADULT TRIAGE NOTE - CHIEF COMPLAINT QUOTE
surgery at [facility] for abdominal hernia on 3/16, for the last 2-3 days, gas pain that is causing pain in my back and chest, increased pain on inspiration. + flatus, + bowel movements

## 2020-04-06 VITALS
OXYGEN SATURATION: 98 % | SYSTOLIC BLOOD PRESSURE: 121 MMHG | HEART RATE: 75 BPM | RESPIRATION RATE: 18 BRPM | DIASTOLIC BLOOD PRESSURE: 69 MMHG | TEMPERATURE: 98 F

## 2020-04-06 LAB
ALBUMIN SERPL ELPH-MCNC: 3.6 G/DL — SIGNIFICANT CHANGE UP (ref 3.3–5)
ALP SERPL-CCNC: 154 U/L — HIGH (ref 40–120)
ALT FLD-CCNC: 85 U/L — HIGH (ref 12–78)
ANION GAP SERPL CALC-SCNC: 8 MMOL/L — SIGNIFICANT CHANGE UP (ref 5–17)
APTT BLD: 33.2 SEC — SIGNIFICANT CHANGE UP (ref 28.5–37)
AST SERPL-CCNC: 41 U/L — HIGH (ref 15–37)
BASOPHILS # BLD AUTO: 0.05 K/UL — SIGNIFICANT CHANGE UP (ref 0–0.2)
BASOPHILS NFR BLD AUTO: 0.8 % — SIGNIFICANT CHANGE UP (ref 0–2)
BILIRUB SERPL-MCNC: 0.3 MG/DL — SIGNIFICANT CHANGE UP (ref 0.2–1.2)
BUN SERPL-MCNC: 9 MG/DL — SIGNIFICANT CHANGE UP (ref 7–23)
CALCIUM SERPL-MCNC: 9.2 MG/DL — SIGNIFICANT CHANGE UP (ref 8.5–10.1)
CHLORIDE SERPL-SCNC: 103 MMOL/L — SIGNIFICANT CHANGE UP (ref 96–108)
CO2 SERPL-SCNC: 26 MMOL/L — SIGNIFICANT CHANGE UP (ref 22–31)
CREAT SERPL-MCNC: 0.98 MG/DL — SIGNIFICANT CHANGE UP (ref 0.5–1.3)
D DIMER BLD IA.RAPID-MCNC: 894 NG/ML DDU — HIGH
EOSINOPHIL # BLD AUTO: 0.06 K/UL — SIGNIFICANT CHANGE UP (ref 0–0.5)
EOSINOPHIL NFR BLD AUTO: 1 % — SIGNIFICANT CHANGE UP (ref 0–6)
GLUCOSE SERPL-MCNC: 104 MG/DL — HIGH (ref 70–99)
HCT VFR BLD CALC: 42.2 % — SIGNIFICANT CHANGE UP (ref 39–50)
HGB BLD-MCNC: 13.6 G/DL — SIGNIFICANT CHANGE UP (ref 13–17)
IMM GRANULOCYTES NFR BLD AUTO: 0.2 % — SIGNIFICANT CHANGE UP (ref 0–1.5)
INR BLD: 1.07 RATIO — SIGNIFICANT CHANGE UP (ref 0.88–1.16)
LIDOCAIN IGE QN: 50 U/L — LOW (ref 73–393)
LYMPHOCYTES # BLD AUTO: 1.82 K/UL — SIGNIFICANT CHANGE UP (ref 1–3.3)
LYMPHOCYTES # BLD AUTO: 30.8 % — SIGNIFICANT CHANGE UP (ref 13–44)
MCHC RBC-ENTMCNC: 29.8 PG — SIGNIFICANT CHANGE UP (ref 27–34)
MCHC RBC-ENTMCNC: 32.2 GM/DL — SIGNIFICANT CHANGE UP (ref 32–36)
MCV RBC AUTO: 92.5 FL — SIGNIFICANT CHANGE UP (ref 80–100)
MONOCYTES # BLD AUTO: 0.72 K/UL — SIGNIFICANT CHANGE UP (ref 0–0.9)
MONOCYTES NFR BLD AUTO: 12.2 % — SIGNIFICANT CHANGE UP (ref 2–14)
NEUTROPHILS # BLD AUTO: 3.24 K/UL — SIGNIFICANT CHANGE UP (ref 1.8–7.4)
NEUTROPHILS NFR BLD AUTO: 55 % — SIGNIFICANT CHANGE UP (ref 43–77)
NRBC # BLD: 0 /100 WBCS — SIGNIFICANT CHANGE UP (ref 0–0)
PLATELET # BLD AUTO: 221 K/UL — SIGNIFICANT CHANGE UP (ref 150–400)
POTASSIUM SERPL-MCNC: 4.5 MMOL/L — SIGNIFICANT CHANGE UP (ref 3.5–5.3)
POTASSIUM SERPL-SCNC: 4.5 MMOL/L — SIGNIFICANT CHANGE UP (ref 3.5–5.3)
PROT SERPL-MCNC: 7.8 GM/DL — SIGNIFICANT CHANGE UP (ref 6–8.3)
PROTHROM AB SERPL-ACNC: 12 SEC — SIGNIFICANT CHANGE UP (ref 10–12.9)
RBC # BLD: 4.56 M/UL — SIGNIFICANT CHANGE UP (ref 4.2–5.8)
RBC # FLD: 14.3 % — SIGNIFICANT CHANGE UP (ref 10.3–14.5)
SODIUM SERPL-SCNC: 137 MMOL/L — SIGNIFICANT CHANGE UP (ref 135–145)
WBC # BLD: 5.9 K/UL — SIGNIFICANT CHANGE UP (ref 3.8–10.5)
WBC # FLD AUTO: 5.9 K/UL — SIGNIFICANT CHANGE UP (ref 3.8–10.5)

## 2020-04-06 PROCEDURE — 74177 CT ABD & PELVIS W/CONTRAST: CPT | Mod: 26

## 2020-04-06 PROCEDURE — 71275 CT ANGIOGRAPHY CHEST: CPT | Mod: 26

## 2020-04-06 RX ORDER — APIXABAN 2.5 MG/1
10 TABLET, FILM COATED ORAL ONCE
Refills: 0 | Status: COMPLETED | OUTPATIENT
Start: 2020-04-06 | End: 2020-04-06

## 2020-04-06 RX ORDER — OXYCODONE AND ACETAMINOPHEN 5; 325 MG/1; MG/1
1 TABLET ORAL ONCE
Refills: 0 | Status: DISCONTINUED | OUTPATIENT
Start: 2020-04-06 | End: 2020-04-06

## 2020-04-06 RX ORDER — APIXABAN 2.5 MG/1
2 TABLET, FILM COATED ORAL
Qty: 40 | Refills: 0
Start: 2020-04-06 | End: 2020-04-15

## 2020-04-06 RX ADMIN — APIXABAN 10 MILLIGRAM(S): 2.5 TABLET, FILM COATED ORAL at 06:46

## 2020-04-06 RX ADMIN — OXYCODONE AND ACETAMINOPHEN 1 TABLET(S): 5; 325 TABLET ORAL at 03:46

## 2020-04-06 NOTE — ED PROVIDER NOTE - NSFOLLOWUPINSTRUCTIONS_ED_ALL_ED_FT
please take medications as prescribed, be careful you are on blood thinners  It will be 10 mg twice daily for 7 days followed by 5 mg twice daily; follow up with your PMD for the remaining prescription    please presume you have COVID and isolate    for testing please call [phone] or [phone]    take tylenol as needed for fever/pain (do not exceed 4000mg/day)    return to hospital if you have trouble breathing or feel much worse

## 2020-04-06 NOTE — ED PROVIDER NOTE - CLINICAL SUMMARY MEDICAL DECISION MAKING FREE TEXT BOX
Pt w above dx, VSS in NAD.  GIven first dose of medication in ED.  Pt given Rx and instructed/cautioned on use. Discussed results and outcome of testing with the patient, given copy as well.  Patient advised to please follow up with their primary care doctor within the next 24 hours and return to the Emergency Department for worsening symptoms or any other concerns.  Patient advised that their doctor may call  to follow up on the specific results of the tests performed today in the emergency department.

## 2020-04-06 NOTE — ED ADULT NURSE NOTE - OBJECTIVE STATEMENT
assisting primary nurse at this time 33 y/o m presents to the ED with multiple medical complaints states that he had abdominal hernia repair on march 16 2020 at [facility] and now has chest pain which increases on inspiration, L- side "sticking" pain, and abdominal pain. + BM

## 2020-04-06 NOTE — ED PROVIDER NOTE - PATIENT PORTAL LINK FT
You can access the FollowMyHealth Patient Portal offered by [facility] by registering at the following website: [email or web address]. By joining [employer]’s FollowMyHealth portal, you will also be able to view your health information using other applications (apps) compatible with our system.

## 2020-04-06 NOTE — ED ADULT NURSE NOTE - CHIEF COMPLAINT QUOTE
surgery at [facility] for abdominal hernia on 3/16, for the last 2-3 days, gas pain that is causing pain in my back and chest, increased pain on inspiration. + flatus, + bowel movements

## 2020-04-06 NOTE — ED PROVIDER NOTE - OBJECTIVE STATEMENT
Pertinent PMH/PSH/FHx/SHx and Review of Systems contained within:    33yo M w PMH of esophageal stricture, umbilical hernia s/p surgery 3/16 presents to ED for eval of upper abd & L sided CP.  Pt states he noted he has had incr indigestion w burping.  States tonight he noted sharp L sided CP w inspiration.  Denies fever, chills, cough, vomiting, diarrhea.  Pt occasionally smokes.    No fever/chills, No photophobia/eye pain/changes in vision, No ear pain/sore throat/dysphagia, +chest pain, no SOB/cough/wheeze/stridor, +abdominal pain, +back pain, no rash, no changes in neurological status/function.

## 2020-09-01 NOTE — ED ADULT TRIAGE NOTE - TEMPERATURE IN FAHRENHEIT (DEGREES F)
98
[facility]    Vertigo  Vertigo is the feeling that you or the things around you are moving when they are not. This feeling can come and go at any time. Vertigo often goes away on its own. This condition can be dangerous if it happens when you are doing activities like driving or working with machines.  Your doctor will do tests to find the cause of your vertigo. These tests will also help your doctor decide on the best treatment for you.  Follow these instructions at home:  Eating and drinking         Drink enough fluid to keep your pee (urine) pale yellow.Do not drink alcohol.Activity     Return to your normal activities as told by your doctor. Ask your doctor what activities are safe for you.In the morning, first sit up on the side of the bed. When you feel okay, stand slowly while you hold onto something until you know that your balance is fine.Move slowly. Avoid sudden body or head movements or certain positions, as told by your doctor.Use a cane if you have trouble standing or walking.Sit down right away if you feel dizzy.Avoid doing any tasks or activities that can cause danger to you or others if you get dizzy.Avoid bending down if you feel dizzy. Place items in your home so that they are easy for you to reach without leaning over.Do not drive or use heavy machinery if you feel dizzy.General instructions     Take over-the-counter and prescription medicines only as told by your doctor.Keep all follow-up visits as told by your doctor. This is important.Contact a doctor if:  Your medicine does not help your vertigo.You have a fever.Your problems get worse or you have new symptoms.Your family or friends see changes in your behavior.The feeling of being sick to your stomach gets worse.Your vomiting gets worse.You lose feeling (have numbness) in part of your body.You feel prickling and tingling in a part of your body.Get help right away if:  You have trouble moving or talking.You are always dizzy.You pass out (faint).You get very bad headaches.You feel weak in your hands, arms, or legs.You have changes in your hearing.You have changes in how you see (vision).You get a stiff neck.Bright light starts to bother you.Summary  Vertigo is the feeling that you or the things around you are moving when they are not.Your doctor will do tests to find the cause of your vertigo.You may be told to avoid some tasks, positions, or movements.Contact a doctor if your medicine is not helping, or if you have a fever, new symptoms, or a change in behavior.Get help right away if you get very bad headaches, or if you have changes in how you speak, hear, or see.This information is not intended to replace advice given to you by your health care provider. Make sure you discuss any questions you have with your health care provider.    Document Released: 09/26/2009 Document Revised: 11/11/2019 Document Reviewed: 11/11/2019  Elsevier Patient Education © 2020 Elsevier Inc.

## 2021-05-20 ENCOUNTER — EMERGENCY (EMERGENCY)
Facility: HOSPITAL | Age: 36
LOS: 0 days | Discharge: HOME | End: 2021-05-21
Attending: EMERGENCY MEDICINE
Payer: MEDICAID

## 2021-05-20 VITALS
TEMPERATURE: 98 F | HEART RATE: 86 BPM | SYSTOLIC BLOOD PRESSURE: 143 MMHG | WEIGHT: 240.08 LBS | RESPIRATION RATE: 18 BRPM | DIASTOLIC BLOOD PRESSURE: 89 MMHG | HEIGHT: 71 IN | OXYGEN SATURATION: 98 %

## 2021-05-20 DIAGNOSIS — F12.99 CANNABIS USE, UNSPECIFIED WITH UNSPECIFIED CANNABIS-INDUCED DISORDER: ICD-10-CM
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
## 2021-05-20 DIAGNOSIS — Z98.890 OTHER SPECIFIED POSTPROCEDURAL STATES: Chronic | ICD-10-CM

## 2021-05-20 DIAGNOSIS — S13.9XXA SPRAIN OF JOINTS AND LIGAMENTS OF UNSPECIFIED PARTS OF NECK, INITIAL ENCOUNTER: ICD-10-CM

## 2021-05-20 DIAGNOSIS — M54.2 CERVICALGIA: ICD-10-CM

## 2021-05-20 DIAGNOSIS — I26.99 OTHER PULMONARY EMBOLISM WITHOUT ACUTE COR PULMONALE: ICD-10-CM

## 2021-05-20 DIAGNOSIS — M25.561 PAIN IN RIGHT KNEE: ICD-10-CM

## 2021-05-20 DIAGNOSIS — R51.9 HEADACHE, UNSPECIFIED: ICD-10-CM

## 2021-05-20 DIAGNOSIS — I10 ESSENTIAL (PRIMARY) HYPERTENSION: ICD-10-CM
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
## 2021-05-20 DIAGNOSIS — Z79.01 LONG TERM (CURRENT) USE OF ANTICOAGULANTS: ICD-10-CM

## 2021-05-20 DIAGNOSIS — K22.2 ESOPHAGEAL OBSTRUCTION: ICD-10-CM

## 2021-05-20 PROCEDURE — 72125 CT NECK SPINE W/O DYE: CPT | Mod: 26

## 2021-05-20 PROCEDURE — 73564 X-RAY EXAM KNEE 4 OR MORE: CPT | Mod: 26,RT

## 2021-05-20 PROCEDURE — 70450 CT HEAD/BRAIN W/O DYE: CPT | Mod: 26

## 2021-05-20 PROCEDURE — 99285 EMERGENCY DEPT VISIT HI MDM: CPT

## 2021-05-20 RX ORDER — METHOCARBAMOL 500 MG/1
750 TABLET, FILM COATED ORAL ONCE
Refills: 0 | Status: COMPLETED | OUTPATIENT
Start: 2021-05-20 | End: 2021-05-20

## 2021-05-20 RX ORDER — ACETAMINOPHEN 500 MG
975 TABLET ORAL ONCE
Refills: 0 | Status: COMPLETED | OUTPATIENT
Start: 2021-05-20 | End: 2021-05-20

## 2021-05-20 RX ADMIN — METHOCARBAMOL 750 MILLIGRAM(S): 500 TABLET, FILM COATED ORAL at 22:11

## 2021-05-20 RX ADMIN — Medication 975 MILLIGRAM(S): at 22:45

## 2021-05-20 RX ADMIN — Medication 975 MILLIGRAM(S): at 22:11
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
## 2021-05-20 NOTE — ED PROVIDER NOTE - PATIENT PORTAL LINK FT
You can access the FollowMyHealth Patient Portal offered by [facility] by registering at the following website: [email or web address]. By joining [employer]’s FollowMyHealth portal, you will also be able to view your health information using other applications (apps) compatible with our system.

## 2021-05-20 NOTE — ED ADULT NURSE NOTE - NSFALLRSKINDICATORS_ED_ALL_ED
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 continue treatment as directed by Dr. Anna Maria. Hold on eyelid surgery until cleared by Dr. Anna Maria.  Once cleared recommend LLL f/u
no

## 2021-05-20 NOTE — ED PROVIDER NOTE - PHYSICAL EXAMINATION
Gen: Alert, NAD, well appearing  Head: NC, AT, EOMI, normal lids/conjunctiva  ENT: normal hearing, patent oropharynx without erythema/exudate, uvula midline  Neck: +supple, +C2-3 midline ttp, no meningismus/JVD, +Trachea midline  Pulm: Bilateral BS, normal resp effort, no wheeze/stridor/retractions  CV: RRR, no M/R/G, +dist pulses  Abd: soft, NT/ND, Negative [place] signs, +BS, no palpable masses  Mskel: no edema/erythema/cyanosis, +right knee effusion with peripatellar tenderness  Skin: no rash, warm/dry  Neuro: AAOx3, no apparent sensory/motor deficits, coordination intact
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
## 2021-05-20 NOTE — ED PROVIDER NOTE - CARE PLAN
Principal Discharge DX:	Cervical sprain, initial encounter  Secondary Diagnosis:	Acute pain of right knee
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
## 2021-05-21 VITALS
OXYGEN SATURATION: 97 % | TEMPERATURE: 99 F | HEART RATE: 68 BPM | SYSTOLIC BLOOD PRESSURE: 120 MMHG | RESPIRATION RATE: 16 BRPM | DIASTOLIC BLOOD PRESSURE: 71 MMHG

## 2021-05-21 RX ORDER — OXYCODONE HYDROCHLORIDE 5 MG/1
1 TABLET ORAL
Qty: 8 | Refills: 0
Start: 2021-05-21 | End: 2021-05-22

## 2021-05-21 RX ORDER — ACETAMINOPHEN 500 MG
2 TABLET ORAL
Qty: 24 | Refills: 0
Start: 2021-05-21 | End: 2021-05-23

## 2021-05-21 RX ORDER — OXYCODONE HYDROCHLORIDE 5 MG/1
1 TABLET ORAL
Qty: 12 | Refills: 0
Start: 2021-05-21 | End: 2021-05-24

## 2021-06-23 ENCOUNTER — APPOINTMENT (OUTPATIENT)
Dept: DISASTER EMERGENCY | Facility: CLINIC | Age: 36
End: 2021-06-23

## 2021-06-23 DIAGNOSIS — Z01.818 ENCOUNTER FOR OTHER PREPROCEDURAL EXAMINATION: ICD-10-CM

## 2021-06-24 ENCOUNTER — TRANSCRIPTION ENCOUNTER (OUTPATIENT)
Age: 36
End: 2021-06-24

## 2021-06-24 RX ORDER — POVIDONE-IODINE 5 %
1 AEROSOL (ML) TOPICAL ONCE
Refills: 0 | Status: COMPLETED | OUTPATIENT
Start: 2021-06-25 | End: 2021-06-25

## 2021-06-24 NOTE — PATIENT PROFILE ADULT - LIVING ENVIRONMENT
Patient needs a letter stating she had a physical on 5/12/17 and she is physically able to work.  Letter should be addressed to [facility].  Patient would like a call back when ready and she would stop to pick it up.      no

## 2021-06-24 NOTE — H&P ADULT - NSHPLABSRESULTS_GEN_ALL_CORE
preop cbc/bmp/coags wnl per medical clearance   UA dos  Preop EKG wnl per clearance   Cr 0.80  covid pending preop cbc/bmp/coags wnl per medical clearance   Preop EKG wnl per clearance   Cr 0.80  covid swab negative 6/23/21

## 2021-06-24 NOTE — H&P ADULT - NSHPSOCIALHISTORY_GEN_ALL_CORE
Occasional EtOH use   Former tobacco use - quit one month ago - was smoking a few cigarettes per day   + marijuana use - 6 joints per day
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
## 2021-06-24 NOTE — H&P ADULT - NSICDXFAMILYHX_GEN_ALL_CORE_FT
Ramón Sutton is a 83 year old male presenting for anticoagulation management, unaccompanied. Patient states that he did take extra dose of Metolazone this week, no affect on INR per Up to date. Patient has no missed or extra doses. No signs of bleeding. No antibiotic use. No hospitalization.  No change in diet or alcohol intake. Patient has no complaints. Patient has upcoming surgery for derm removal on face and leg. Patient states that he is to hold daily aspirin, plavix and warfarin for 10 days. Will confer with Cardiology. Will continue on current dosing schedule and recheck INR in 2 weeks with Dr. Selby appt. Reminded him to be consistent with diet. To contact us with any problems, bleeding or medication changes and he verbalized understanding.       Anticoagulation Episode Summary     Current INR goal: 2.0-3.0   TTR: 79.1 % (11.8 mo)   Next INR check: 7/18/2017   INR from last check: 2.0 (7/3/2017)   Weekly max dose:    Target end date: Indefinite   INR check location: [facility]   Preferred lab:    Send INR reminders to: [facility]AN[facility] (OPEN ENROLLMENT) [place]    Indications   Paroxysmal ventricular tachycardia (CMS/[facility]) [I47.2]  Anticoagulation management encounter [Z51.81  Z79.01]  Paroxysmal atrial fibrillation [I48.0]           Comments:          Anticoagulation Care Providers     Provider Role Specialty Phone number    JOSE Qureshi Referring Nurse Practitioner - Family [phone]            
FAMILY HISTORY:  No pertinent family history in first degree relatives

## 2021-06-24 NOTE — H&P ADULT - PROBLEM SELECTOR PLAN 1
Admit to Orthopaedic Service.  Presents today for elective LEFT TKR.   Pt medically stable and cleared for procedure today by Dr. Stephenson.

## 2021-06-24 NOTE — H&P ADULT - NSHPPHYSICALEXAM_GEN_ALL_CORE
PE:  Decreased ROM secondary to pain. Rest of PE per medical clearance. MSK: + decreased ROM 2/2 pain, left knee  Skin warm and well perfused, no visible wounds/erythema/ecchymoses  EHL/FHL/TA/GS 5/5 motor strength bilateral lower extremities   SLT in tact to distal bilateral lower extremities   DP pulses palpable bilaterally     Remainder of exam per medical clearance note

## 2021-06-24 NOTE — H&P ADULT - ATTENDING COMMENTS
Pt. seen/ examined  Discussed risks/ benefits/ alternatives of the procedure  Consent obtained  Side/ site marked  Proceeded to OR for an uneventful L TKA

## 2021-06-25 ENCOUNTER — INPATIENT (INPATIENT)
Facility: HOSPITAL | Age: 36
LOS: 2 days | Discharge: HOME HEALTH | DRG: 470 | End: 2021-06-28
Attending: ORTHOPAEDIC SURGERY | Admitting: ORTHOPAEDIC SURGERY
Payer: COMMERCIAL

## 2021-06-25 ENCOUNTER — TRANSCRIPTION ENCOUNTER (OUTPATIENT)
Age: 36
End: 2021-06-25

## 2021-06-25 VITALS
DIASTOLIC BLOOD PRESSURE: 84 MMHG | TEMPERATURE: 98 F | HEIGHT: 70 IN | SYSTOLIC BLOOD PRESSURE: 131 MMHG | OXYGEN SATURATION: 98 % | WEIGHT: 246.48 LBS | HEART RATE: 63 BPM | RESPIRATION RATE: 16 BRPM

## 2021-06-25 DIAGNOSIS — Z98.890 OTHER SPECIFIED POSTPROCEDURAL STATES: Chronic | ICD-10-CM

## 2021-06-25 DIAGNOSIS — Z86.711 PERSONAL HISTORY OF PULMONARY EMBOLISM: ICD-10-CM

## 2021-06-25 DIAGNOSIS — M19.90 UNSPECIFIED OSTEOARTHRITIS, UNSPECIFIED SITE: ICD-10-CM

## 2021-06-25 DIAGNOSIS — I10 ESSENTIAL (PRIMARY) HYPERTENSION: ICD-10-CM

## 2021-06-25 LAB
APPEARANCE UR: CLEAR — SIGNIFICANT CHANGE UP
BACTERIA # UR AUTO: PRESENT /HPF
BILIRUB UR-MCNC: NEGATIVE — SIGNIFICANT CHANGE UP
COLOR SPEC: YELLOW — SIGNIFICANT CHANGE UP
COMMENT - URINE: SIGNIFICANT CHANGE UP
DIFF PNL FLD: ABNORMAL
EPI CELLS # UR: SIGNIFICANT CHANGE UP /HPF (ref 0–5)
GLUCOSE UR QL: NEGATIVE — SIGNIFICANT CHANGE UP
KETONES UR-MCNC: NEGATIVE — SIGNIFICANT CHANGE UP
LEUKOCYTE ESTERASE UR-ACNC: NEGATIVE — SIGNIFICANT CHANGE UP
NITRITE UR-MCNC: NEGATIVE — SIGNIFICANT CHANGE UP
PH UR: 6 — SIGNIFICANT CHANGE UP (ref 5–8)
PROT UR-MCNC: NEGATIVE MG/DL — SIGNIFICANT CHANGE UP
RBC CASTS # UR COMP ASSIST: < 5 /HPF — SIGNIFICANT CHANGE UP
SP GR SPEC: >=1.03 — SIGNIFICANT CHANGE UP (ref 1–1.03)
UROBILINOGEN FLD QL: 0.2 E.U./DL — SIGNIFICANT CHANGE UP
WBC UR QL: < 5 /HPF — SIGNIFICANT CHANGE UP

## 2021-06-25 PROCEDURE — 73564 X-RAY EXAM KNEE 4 OR MORE: CPT | Mod: 26,50

## 2021-06-25 PROCEDURE — 73560 X-RAY EXAM OF KNEE 1 OR 2: CPT | Mod: 26,LT

## 2021-06-25 RX ORDER — CELECOXIB 200 MG/1
400 CAPSULE ORAL ONCE
Refills: 0 | Status: COMPLETED | OUTPATIENT
Start: 2021-06-25 | End: 2021-06-25

## 2021-06-25 RX ORDER — PANTOPRAZOLE SODIUM 20 MG/1
40 TABLET, DELAYED RELEASE ORAL
Refills: 0 | Status: DISCONTINUED | OUTPATIENT
Start: 2021-06-25 | End: 2021-06-28

## 2021-06-25 RX ORDER — ACETAMINOPHEN 500 MG
975 TABLET ORAL EVERY 8 HOURS
Refills: 0 | Status: DISCONTINUED | OUTPATIENT
Start: 2021-06-25 | End: 2021-06-26

## 2021-06-25 RX ORDER — OXYCODONE HYDROCHLORIDE 5 MG/1
10 TABLET ORAL EVERY 4 HOURS
Refills: 0 | Status: DISCONTINUED | OUTPATIENT
Start: 2021-06-25 | End: 2021-06-26

## 2021-06-25 RX ORDER — HYDROMORPHONE HYDROCHLORIDE 2 MG/ML
0.5 INJECTION INTRAMUSCULAR; INTRAVENOUS; SUBCUTANEOUS
Refills: 0 | Status: COMPLETED | OUTPATIENT
Start: 2021-06-25 | End: 2021-06-25

## 2021-06-25 RX ORDER — HYDROMORPHONE HYDROCHLORIDE 2 MG/ML
0.5 INJECTION INTRAMUSCULAR; INTRAVENOUS; SUBCUTANEOUS EVERY 4 HOURS
Refills: 0 | Status: DISCONTINUED | OUTPATIENT
Start: 2021-06-25 | End: 2021-06-26

## 2021-06-25 RX ORDER — SODIUM CHLORIDE 9 MG/ML
1000 INJECTION, SOLUTION INTRAVENOUS
Refills: 0 | Status: DISCONTINUED | OUTPATIENT
Start: 2021-06-26 | End: 2021-06-28

## 2021-06-25 RX ORDER — ONDANSETRON 8 MG/1
4 TABLET, FILM COATED ORAL EVERY 6 HOURS
Refills: 0 | Status: DISCONTINUED | OUTPATIENT
Start: 2021-06-25 | End: 2021-06-28

## 2021-06-25 RX ORDER — SENNA PLUS 8.6 MG/1
2 TABLET ORAL AT BEDTIME
Refills: 0 | Status: DISCONTINUED | OUTPATIENT
Start: 2021-06-25 | End: 2021-06-28

## 2021-06-25 RX ORDER — APIXABAN 2.5 MG/1
5 TABLET, FILM COATED ORAL
Refills: 0 | Status: DISCONTINUED | OUTPATIENT
Start: 2021-06-25 | End: 2021-06-28

## 2021-06-25 RX ORDER — CEFAZOLIN SODIUM 1 G
2000 VIAL (EA) INJECTION EVERY 8 HOURS
Refills: 0 | Status: COMPLETED | OUTPATIENT
Start: 2021-06-25 | End: 2021-06-26

## 2021-06-25 RX ORDER — PROCHLORPERAZINE MALEATE 5 MG
5 TABLET ORAL ONCE
Refills: 0 | Status: DISCONTINUED | OUTPATIENT
Start: 2021-06-25 | End: 2021-06-28

## 2021-06-25 RX ORDER — OXYCODONE HYDROCHLORIDE 5 MG/1
5 TABLET ORAL EVERY 4 HOURS
Refills: 0 | Status: DISCONTINUED | OUTPATIENT
Start: 2021-06-25 | End: 2021-06-26

## 2021-06-25 RX ORDER — OXYCODONE HYDROCHLORIDE 5 MG/1
20 TABLET ORAL ONCE
Refills: 0 | Status: DISCONTINUED | OUTPATIENT
Start: 2021-06-25 | End: 2021-06-25

## 2021-06-25 RX ORDER — CELECOXIB 200 MG/1
200 CAPSULE ORAL EVERY 12 HOURS
Refills: 0 | Status: DISCONTINUED | OUTPATIENT
Start: 2021-06-26 | End: 2021-06-28

## 2021-06-25 RX ORDER — POLYETHYLENE GLYCOL 3350 17 G/17G
17 POWDER, FOR SOLUTION ORAL AT BEDTIME
Refills: 0 | Status: DISCONTINUED | OUTPATIENT
Start: 2021-06-25 | End: 2021-06-28

## 2021-06-25 RX ORDER — CEFAZOLIN SODIUM 1 G
2000 VIAL (EA) INJECTION EVERY 8 HOURS
Refills: 0 | Status: DISCONTINUED | OUTPATIENT
Start: 2021-06-25 | End: 2021-06-25

## 2021-06-25 RX ORDER — CHLORHEXIDINE GLUCONATE 213 G/1000ML
1 SOLUTION TOPICAL ONCE
Refills: 0 | Status: COMPLETED | OUTPATIENT
Start: 2021-06-25 | End: 2021-06-25

## 2021-06-25 RX ADMIN — HYDROMORPHONE HYDROCHLORIDE 0.5 MILLIGRAM(S): 2 INJECTION INTRAMUSCULAR; INTRAVENOUS; SUBCUTANEOUS at 16:32

## 2021-06-25 RX ADMIN — OXYCODONE HYDROCHLORIDE 10 MILLIGRAM(S): 5 TABLET ORAL at 18:15

## 2021-06-25 RX ADMIN — HYDROMORPHONE HYDROCHLORIDE 0.5 MILLIGRAM(S): 2 INJECTION INTRAMUSCULAR; INTRAVENOUS; SUBCUTANEOUS at 18:13

## 2021-06-25 RX ADMIN — Medication 975 MILLIGRAM(S): at 19:39

## 2021-06-25 RX ADMIN — HYDROMORPHONE HYDROCHLORIDE 0.5 MILLIGRAM(S): 2 INJECTION INTRAMUSCULAR; INTRAVENOUS; SUBCUTANEOUS at 15:59

## 2021-06-25 RX ADMIN — CELECOXIB 400 MILLIGRAM(S): 200 CAPSULE ORAL at 11:05

## 2021-06-25 RX ADMIN — HYDROMORPHONE HYDROCHLORIDE 0.5 MILLIGRAM(S): 2 INJECTION INTRAMUSCULAR; INTRAVENOUS; SUBCUTANEOUS at 18:30

## 2021-06-25 RX ADMIN — OXYCODONE HYDROCHLORIDE 5 MILLIGRAM(S): 5 TABLET ORAL at 17:46

## 2021-06-25 RX ADMIN — Medication 1 APPLICATION(S): at 10:15

## 2021-06-25 RX ADMIN — HYDROMORPHONE HYDROCHLORIDE 0.5 MILLIGRAM(S): 2 INJECTION INTRAMUSCULAR; INTRAVENOUS; SUBCUTANEOUS at 16:15

## 2021-06-25 RX ADMIN — OXYCODONE HYDROCHLORIDE 20 MILLIGRAM(S): 5 TABLET ORAL at 11:05

## 2021-06-25 RX ADMIN — Medication 2000 MILLIGRAM(S): at 20:03

## 2021-06-25 RX ADMIN — CHLORHEXIDINE GLUCONATE 1 APPLICATION(S): 213 SOLUTION TOPICAL at 10:00

## 2021-06-25 RX ADMIN — ONDANSETRON 4 MILLIGRAM(S): 8 TABLET, FILM COATED ORAL at 23:04

## 2021-06-25 NOTE — DISCHARGE NOTE PROVIDER - NSDCFUADDINST_GEN_ALL_CORE_FT
No strenuous activity, heavy lifting, driving or returning to work until cleared by MD.  You may shower - dressing is water-resistant, no soaking in bathtubs.  Remove dressing after post op day 5-7, then leave incision open to air. Keep incision clean and dry.  Try to have regular bowel movements, take stool softener or laxative if necessary.  Swelling may travel all the way down extremity, this is normal and will subside in a few weeks.  Call to schedule an appt with Dr. Tomas for follow up, if you have staples or sutures they will be removed in office.  Contact your doctor if you experience: fever greater than 101.5, chills, chest pain, difficulty breathing, redness or excessive drainage around the incision, other concerns.  No strenuous activity, heavy lifting, driving or returning to work until cleared by MD.  Dressing - you have an incisional wound vac dressing with attached canister and battery pack. You may shower but must keep battery pack dry at all times. The battery dies in 7 days then can remove dressing and leave open to air. Soap and water ok, pat dry. Keep incision clean.    Try to have regular bowel movements, take stool softener or laxative if necessary.  Swelling may travel all the way down extremity, this is normal and will subside in a few weeks.  Call to schedule an appt with Dr. Tomas for follow up, if you have staples or sutures they will be removed in office.  Contact your doctor if you experience: fever greater than 101.5, chills, chest pain, difficulty breathing, redness or excessive drainage around the incision, other concerns.  Wear Knee Immobilizer as Directed by Dr. Tomas   No strenuous activity, heavy lifting, driving or returning to work until cleared by MD.  Dressing - you have an incisional wound vac dressing with attached canister and battery pack. You may shower but must keep battery pack dry at all times. The battery dies in 7 days then can remove dressing and leave open to air. Soap and water ok, pat dry. Keep incision clean.    Try to have regular bowel movements, take stool softener or laxative if necessary.  Swelling may travel all the way down extremity, this is normal and will subside in a few weeks.  Call to schedule an appt with Dr. Tomas for follow up, if you have staples or sutures they will be removed in office.  Contact your doctor if you experience: fever greater than 101.5, chills, chest pain, difficulty breathing, redness or excessive drainage around the incision, other concerns.

## 2021-06-25 NOTE — DISCHARGE NOTE PROVIDER - HOSPITAL COURSE
Admitted  Surgery LEFT TKR  Kirsten-op Antibiotics  Pain control  DVT prophylaxis  OOB/Physical Therapy Admitted to Orthopedics  Attending: Dr. Tomas   Surgery LEFT TKR  Kirsten-op Antibiotics  Pain control  DVT prophylaxis  OOB/Physical Therapy Admitted to Orthopedics  Attending: Dr. Tomas   Surgery LEFT TKR  Kirsten-op Antibiotics  Pain control  DVT prophylaxis Eliquis   OOB/Physical Therapy - cleared 6/28   Doppler to evaluate BLE prior to discharge given hx of unprovoked PE

## 2021-06-25 NOTE — DISCHARGE NOTE PROVIDER - CARE PROVIDER_API CALL
Vargas Tomas)  Orthopaedic Surgery Surgery  [address] [place] [address]  Phone: [phone]  Fax: [phone]  Follow Up Time:    Vargas Tomas)  Orthopaedic Surgery Surgery  [address] [place] [address]  Phone: [phone]  Fax: [phone]  Follow Up Time: 2 weeks

## 2021-06-25 NOTE — PROGRESS NOTE ADULT - SUBJECTIVE AND OBJECTIVE BOX
Ortho Post Op Check    Procedure:  Surgeon:    Pt comfortable without complaints, pain controlled  Denies CP, SOB, N/V, numbness/tingling     Vital Signs Last 24 Hrs  T(C): 36.5 (06-25-21 @ 20:50), Max: 36.5 (06-25-21 @ 20:50)  T(F): 97.7 (06-25-21 @ 20:50), Max: 97.7 (06-25-21 @ 20:50)  HR: 61 (06-25-21 @ 20:50) (61 - 76)  BP: 143/87 (06-25-21 @ 20:50) (133/84 - 147/88)  BP(mean): 110 (06-25-21 @ 20:07) (103 - 110)  RR: 17 (06-25-21 @ 20:50) (12 - 31)  SpO2: 98% (06-25-21 @ 20:50) (97% - 98%)  AVSS    General: Pt Alert and oriented, NAD  DSG C/D/I  Pulses:  Sensation:  Motor: EHL/FHL/TA/GS        Post-op X-Ray:    A/P: 35yMale POD#0 s/p   - Stable  - Pain Control  - DVT ppx:  - Post op abx:  - PT, WBS:     Ortho Pager [phone] Ortho Post Op Check    Procedure: L TKA  Surgeon: Genoveva    Pt comfortable without complaints, pain controlled  Denies CP, SOB, N/V, numbness/tingling     Vital Signs Last 24 Hrs  T(C): 36.5 (06-25-21 @ 20:50), Max: 36.5 (06-25-21 @ 20:50)  T(F): 97.7 (06-25-21 @ 20:50), Max: 97.7 (06-25-21 @ 20:50)  HR: 61 (06-25-21 @ 20:50) (61 - 76)  BP: 143/87 (06-25-21 @ 20:50) (133/84 - 147/88)  BP(mean): 110 (06-25-21 @ 20:07) (103 - 110)  RR: 17 (06-25-21 @ 20:50) (12 - 31)  SpO2: 98% (06-25-21 @ 20:50) (97% - 98%)  AVSS    General: Pt Alert and oriented, NAD  L knee prevena DSG C/D/I  Sensation intact s/s/sp/dp/t and symmetric   Motor: EHL/FHL/TA/GS 5/5 and symmetric   2+ DP pulse  Toes WWP      Post-op X-Ray: Hardware intact    A/P: 35yMale POD#0 s/p L TKA 6/25  - Stable  - Pain Control  - DVT ppx: ASA  - Post op abx: ancef periop  - PT, WBS: WBAT in KI for few days  - Dispo pending PT eval     Ortho Pager [phone] Ortho Post Op Check    Procedure: L TKA  Surgeon: Genoveva    Pt comfortable without complaints, pain controlled  Denies CP, SOB, N/V, numbness/tingling     Vital Signs Last 24 Hrs  T(C): 36.5 (06-25-21 @ 20:50), Max: 36.5 (06-25-21 @ 20:50)  T(F): 97.7 (06-25-21 @ 20:50), Max: 97.7 (06-25-21 @ 20:50)  HR: 61 (06-25-21 @ 20:50) (61 - 76)  BP: 143/87 (06-25-21 @ 20:50) (133/84 - 147/88)  BP(mean): 110 (06-25-21 @ 20:07) (103 - 110)  RR: 17 (06-25-21 @ 20:50) (12 - 31)  SpO2: 98% (06-25-21 @ 20:50) (97% - 98%)  AVSS    General: Pt Alert and oriented, NAD  L knee prevena DSG C/D/I  Sensation intact s/s/sp/dp/t and symmetric   Motor: EHL/FHL/TA/GS 5/5 and symmetric   2+ DP pulse  Toes WWP      Post-op X-Ray: Hardware intact    A/P: 35yMale POD#0 s/p L TKA 6/25  - Stable  - Pain Control  - DVT ppx: Eliquis   - Post op abx: ancef periop  - PT, WBS: WBAT in KI for few days  - Dispo pending PT eval     Ortho Pager [phone]

## 2021-06-25 NOTE — PHYSICAL THERAPY INITIAL EVALUATION ADULT - ADDITIONAL COMMENTS
Pt lives with significant other in a family home with 3 steps to enter and 13 steps once inside. Pt denies use of DME prior sx.
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
## 2021-06-25 NOTE — DISCHARGE NOTE PROVIDER - NSDCMRMEDTOKEN_GEN_ALL_CORE_FT
Eliquis 5 mg oral tablet: 2 tab(s) orally 2 times a day    acetaminophen 325 mg oral tablet: 3 tab(s) orally every 8 hours  Eliquis 5 mg oral tablet: 2 tab(s) orally 2 times a day   HYDROmorphone 2 mg oral tablet: 1 tab(s) orally every 4 hours, As Needed -Moderate Pain (4 - 6) - for severe pain MDD:6   polyethylene glycol 3350 oral powder for reconstitution: 17 gram(s) orally once a day (at bedtime)

## 2021-06-26 LAB
ANION GAP SERPL CALC-SCNC: 10 MMOL/L — SIGNIFICANT CHANGE UP (ref 5–17)
BUN SERPL-MCNC: 11 MG/DL — SIGNIFICANT CHANGE UP (ref 7–23)
CALCIUM SERPL-MCNC: 8.8 MG/DL — SIGNIFICANT CHANGE UP (ref 8.4–10.5)
CHLORIDE SERPL-SCNC: 102 MMOL/L — SIGNIFICANT CHANGE UP (ref 96–108)
CO2 SERPL-SCNC: 26 MMOL/L — SIGNIFICANT CHANGE UP (ref 22–31)
COVID-19 SPIKE DOMAIN AB INTERP: POSITIVE
COVID-19 SPIKE DOMAIN ANTIBODY RESULT: 144 U/ML — HIGH
CREAT SERPL-MCNC: 0.91 MG/DL — SIGNIFICANT CHANGE UP (ref 0.5–1.3)
CULTURE RESULTS: NO GROWTH — SIGNIFICANT CHANGE UP
GLUCOSE SERPL-MCNC: 114 MG/DL — HIGH (ref 70–99)
HCT VFR BLD CALC: 35.4 % — LOW (ref 39–50)
HGB BLD-MCNC: 11.5 G/DL — LOW (ref 13–17)
MCHC RBC-ENTMCNC: 30.1 PG — SIGNIFICANT CHANGE UP (ref 27–34)
MCHC RBC-ENTMCNC: 32.5 GM/DL — SIGNIFICANT CHANGE UP (ref 32–36)
MCV RBC AUTO: 92.7 FL — SIGNIFICANT CHANGE UP (ref 80–100)
NRBC # BLD: 0 /100 WBCS — SIGNIFICANT CHANGE UP (ref 0–0)
PLATELET # BLD AUTO: 187 K/UL — SIGNIFICANT CHANGE UP (ref 150–400)
POTASSIUM SERPL-MCNC: 4.4 MMOL/L — SIGNIFICANT CHANGE UP (ref 3.5–5.3)
POTASSIUM SERPL-SCNC: 4.4 MMOL/L — SIGNIFICANT CHANGE UP (ref 3.5–5.3)
RBC # BLD: 3.82 M/UL — LOW (ref 4.2–5.8)
RBC # FLD: 14.9 % — HIGH (ref 10.3–14.5)
SARS-COV-2 IGG+IGM SERPL QL IA: 144 U/ML — HIGH
SARS-COV-2 IGG+IGM SERPL QL IA: POSITIVE
SODIUM SERPL-SCNC: 138 MMOL/L — SIGNIFICANT CHANGE UP (ref 135–145)
SPECIMEN SOURCE: SIGNIFICANT CHANGE UP
WBC # BLD: 10.72 K/UL — HIGH (ref 3.8–10.5)
WBC # FLD AUTO: 10.72 K/UL — HIGH (ref 3.8–10.5)

## 2021-06-26 RX ORDER — HYDROMORPHONE HYDROCHLORIDE 2 MG/ML
0.5 INJECTION INTRAMUSCULAR; INTRAVENOUS; SUBCUTANEOUS ONCE
Refills: 0 | Status: DISCONTINUED | OUTPATIENT
Start: 2021-06-26 | End: 2021-06-26

## 2021-06-26 RX ORDER — OXYCODONE HYDROCHLORIDE 5 MG/1
1 TABLET ORAL
Qty: 40 | Refills: 0
Start: 2021-06-26

## 2021-06-26 RX ORDER — KETOROLAC TROMETHAMINE 30 MG/ML
15 SYRINGE (ML) INJECTION ONCE
Refills: 0 | Status: DISCONTINUED | OUTPATIENT
Start: 2021-06-26 | End: 2021-06-27

## 2021-06-26 RX ORDER — HYDROMORPHONE HYDROCHLORIDE 2 MG/ML
1 INJECTION INTRAMUSCULAR; INTRAVENOUS; SUBCUTANEOUS EVERY 4 HOURS
Refills: 0 | Status: DISCONTINUED | OUTPATIENT
Start: 2021-06-26 | End: 2021-06-28

## 2021-06-26 RX ORDER — OXYCODONE HYDROCHLORIDE 5 MG/1
10 TABLET ORAL EVERY 4 HOURS
Refills: 0 | Status: DISCONTINUED | OUTPATIENT
Start: 2021-06-26 | End: 2021-06-27

## 2021-06-26 RX ORDER — ACETAMINOPHEN 500 MG
3 TABLET ORAL
Qty: 0 | Refills: 0 | DISCHARGE
Start: 2021-06-26

## 2021-06-26 RX ORDER — ACETAMINOPHEN 500 MG
975 TABLET ORAL EVERY 8 HOURS
Refills: 0 | Status: DISCONTINUED | OUTPATIENT
Start: 2021-06-26 | End: 2021-06-28

## 2021-06-26 RX ORDER — OXYCODONE HYDROCHLORIDE 5 MG/1
15 TABLET ORAL EVERY 4 HOURS
Refills: 0 | Status: DISCONTINUED | OUTPATIENT
Start: 2021-06-26 | End: 2021-06-27

## 2021-06-26 RX ORDER — KETOROLAC TROMETHAMINE 30 MG/ML
15 SYRINGE (ML) INJECTION EVERY 6 HOURS
Refills: 0 | Status: DISCONTINUED | OUTPATIENT
Start: 2021-06-26 | End: 2021-06-27

## 2021-06-26 RX ORDER — POLYETHYLENE GLYCOL 3350 17 G/17G
17 POWDER, FOR SOLUTION ORAL
Qty: 0 | Refills: 0 | DISCHARGE
Start: 2021-06-26

## 2021-06-26 RX ADMIN — HYDROMORPHONE HYDROCHLORIDE 0.5 MILLIGRAM(S): 2 INJECTION INTRAMUSCULAR; INTRAVENOUS; SUBCUTANEOUS at 11:27

## 2021-06-26 RX ADMIN — CELECOXIB 200 MILLIGRAM(S): 200 CAPSULE ORAL at 17:49

## 2021-06-26 RX ADMIN — OXYCODONE HYDROCHLORIDE 10 MILLIGRAM(S): 5 TABLET ORAL at 04:20

## 2021-06-26 RX ADMIN — OXYCODONE HYDROCHLORIDE 15 MILLIGRAM(S): 5 TABLET ORAL at 21:49

## 2021-06-26 RX ADMIN — Medication 15 MILLIGRAM(S): at 17:50

## 2021-06-26 RX ADMIN — CELECOXIB 200 MILLIGRAM(S): 200 CAPSULE ORAL at 17:58

## 2021-06-26 RX ADMIN — Medication 15 MILLIGRAM(S): at 12:30

## 2021-06-26 RX ADMIN — Medication 975 MILLIGRAM(S): at 12:14

## 2021-06-26 RX ADMIN — OXYCODONE HYDROCHLORIDE 10 MILLIGRAM(S): 5 TABLET ORAL at 08:59

## 2021-06-26 RX ADMIN — APIXABAN 5 MILLIGRAM(S): 2.5 TABLET, FILM COATED ORAL at 17:50

## 2021-06-26 RX ADMIN — OXYCODONE HYDROCHLORIDE 15 MILLIGRAM(S): 5 TABLET ORAL at 13:24

## 2021-06-26 RX ADMIN — OXYCODONE HYDROCHLORIDE 15 MILLIGRAM(S): 5 TABLET ORAL at 12:54

## 2021-06-26 RX ADMIN — OXYCODONE HYDROCHLORIDE 10 MILLIGRAM(S): 5 TABLET ORAL at 09:30

## 2021-06-26 RX ADMIN — HYDROMORPHONE HYDROCHLORIDE 0.5 MILLIGRAM(S): 2 INJECTION INTRAMUSCULAR; INTRAVENOUS; SUBCUTANEOUS at 07:51

## 2021-06-26 RX ADMIN — Medication 15 MILLIGRAM(S): at 23:43

## 2021-06-26 RX ADMIN — HYDROMORPHONE HYDROCHLORIDE 0.5 MILLIGRAM(S): 2 INJECTION INTRAMUSCULAR; INTRAVENOUS; SUBCUTANEOUS at 07:21

## 2021-06-26 RX ADMIN — HYDROMORPHONE HYDROCHLORIDE 1 MILLIGRAM(S): 2 INJECTION INTRAMUSCULAR; INTRAVENOUS; SUBCUTANEOUS at 19:26

## 2021-06-26 RX ADMIN — Medication 975 MILLIGRAM(S): at 21:48

## 2021-06-26 RX ADMIN — Medication 975 MILLIGRAM(S): at 12:44

## 2021-06-26 RX ADMIN — APIXABAN 5 MILLIGRAM(S): 2.5 TABLET, FILM COATED ORAL at 05:36

## 2021-06-26 RX ADMIN — Medication 15 MILLIGRAM(S): at 12:14

## 2021-06-26 RX ADMIN — PANTOPRAZOLE SODIUM 40 MILLIGRAM(S): 20 TABLET, DELAYED RELEASE ORAL at 05:36

## 2021-06-26 RX ADMIN — CELECOXIB 200 MILLIGRAM(S): 200 CAPSULE ORAL at 06:36

## 2021-06-26 RX ADMIN — Medication 975 MILLIGRAM(S): at 22:18

## 2021-06-26 RX ADMIN — OXYCODONE HYDROCHLORIDE 15 MILLIGRAM(S): 5 TABLET ORAL at 22:19

## 2021-06-26 RX ADMIN — POLYETHYLENE GLYCOL 3350 17 GRAM(S): 17 POWDER, FOR SOLUTION ORAL at 21:50

## 2021-06-26 RX ADMIN — HYDROMORPHONE HYDROCHLORIDE 0.5 MILLIGRAM(S): 2 INJECTION INTRAMUSCULAR; INTRAVENOUS; SUBCUTANEOUS at 10:57

## 2021-06-26 RX ADMIN — HYDROMORPHONE HYDROCHLORIDE 1 MILLIGRAM(S): 2 INJECTION INTRAMUSCULAR; INTRAVENOUS; SUBCUTANEOUS at 15:35

## 2021-06-26 RX ADMIN — HYDROMORPHONE HYDROCHLORIDE 1 MILLIGRAM(S): 2 INJECTION INTRAMUSCULAR; INTRAVENOUS; SUBCUTANEOUS at 19:40

## 2021-06-26 RX ADMIN — SENNA PLUS 2 TABLET(S): 8.6 TABLET ORAL at 21:49

## 2021-06-26 RX ADMIN — Medication 1 TABLET(S): at 12:15

## 2021-06-26 RX ADMIN — HYDROMORPHONE HYDROCHLORIDE 1 MILLIGRAM(S): 2 INJECTION INTRAMUSCULAR; INTRAVENOUS; SUBCUTANEOUS at 15:05

## 2021-06-26 RX ADMIN — HYDROMORPHONE HYDROCHLORIDE 1 MILLIGRAM(S): 2 INJECTION INTRAMUSCULAR; INTRAVENOUS; SUBCUTANEOUS at 23:44

## 2021-06-26 RX ADMIN — CELECOXIB 200 MILLIGRAM(S): 200 CAPSULE ORAL at 05:36

## 2021-06-26 RX ADMIN — OXYCODONE HYDROCHLORIDE 15 MILLIGRAM(S): 5 TABLET ORAL at 17:04

## 2021-06-26 RX ADMIN — Medication 2000 MILLIGRAM(S): at 03:28

## 2021-06-26 RX ADMIN — OXYCODONE HYDROCHLORIDE 15 MILLIGRAM(S): 5 TABLET ORAL at 17:30

## 2021-06-26 RX ADMIN — Medication 15 MILLIGRAM(S): at 18:15

## 2021-06-26 RX ADMIN — OXYCODONE HYDROCHLORIDE 10 MILLIGRAM(S): 5 TABLET ORAL at 05:20

## 2021-06-26 NOTE — PROGRESS NOTE ADULT - SUBJECTIVE AND OBJECTIVE BOX
Ortho Note    Pt comfortable without complaints, pain controlled  Denies CP, SOB, N/V, numbness/tingling     Vital Signs Last 24 Hrs  T(C): 36.7 (06-26-21 @ 04:26), Max: 36.7 (06-26-21 @ 04:26)  T(F): 98.1 (06-26-21 @ 04:26), Max: 98.1 (06-26-21 @ 04:26)  HR: 61 (06-26-21 @ 04:26) (61 - 61)  BP: 124/69 (06-26-21 @ 04:26) (124/69 - 124/69)  BP(mean): --  RR: 18 (06-26-21 @ 04:26) (18 - 18)  SpO2: 100% (06-26-21 @ 04:26) (100% - 100%)  AVSS    General: Pt Alert and oriented, NAD  L knee prevena DSG C/D/I  Sensation intact s/s/sp/dp/t and symmetric   Motor: EHL/FHL/TA/GS 5/5 and symmetric   2+ DP pulse  Toes WWP        A/P: 35yMale POD#0 s/p L TKA 6/25  - Stable  - Pain Control  - DVT ppx: ASA  - Post op abx: ancef periop  - PT, WBS: WBAT in KI for few days  - Dispo likely home today     Ortho Pager [phone] Ortho Note    Pt comfortable without complaints, pain controlled  Denies CP, SOB, N/V, numbness/tingling     Vital Signs Last 24 Hrs  T(C): 36.7 (06-26-21 @ 04:26), Max: 36.7 (06-26-21 @ 04:26)  T(F): 98.1 (06-26-21 @ 04:26), Max: 98.1 (06-26-21 @ 04:26)  HR: 61 (06-26-21 @ 04:26) (61 - 61)  BP: 124/69 (06-26-21 @ 04:26) (124/69 - 124/69)  BP(mean): --  RR: 18 (06-26-21 @ 04:26) (18 - 18)  SpO2: 100% (06-26-21 @ 04:26) (100% - 100%)  AVSS    General: Pt Alert and oriented, NAD  L knee prevena DSG C/D/I  Sensation intact s/s/sp/dp/t and symmetric   Motor: EHL/FHL/TA/GS 5/5 and symmetric   2+ DP pulse  Toes WWP        A/P: 35yMale POD#0 s/p L TKA 6/25  - Stable  - Pain Control  - DVT ppx: Eliquis   - Post op abx: ancef periop  - PT, WBS: WBAT in KI for few days  - Dispo likely home today     Ortho Pager [phone]

## 2021-06-26 NOTE — PROGRESS NOTE ADULT - ATTENDING COMMENTS
Pt. seen/ examined  Labs/ vitals reviewed  Prevena functional, minimal serosanguinous fluid  NVID  Discussed/ demonstrated full extension position, use of knee immobilizer  Discussed D/C planning  Will follow PT

## 2021-06-27 LAB
ANION GAP SERPL CALC-SCNC: 8 MMOL/L — SIGNIFICANT CHANGE UP (ref 5–17)
BUN SERPL-MCNC: 10 MG/DL — SIGNIFICANT CHANGE UP (ref 7–23)
CALCIUM SERPL-MCNC: 8.9 MG/DL — SIGNIFICANT CHANGE UP (ref 8.4–10.5)
CHLORIDE SERPL-SCNC: 103 MMOL/L — SIGNIFICANT CHANGE UP (ref 96–108)
CO2 SERPL-SCNC: 29 MMOL/L — SIGNIFICANT CHANGE UP (ref 22–31)
CREAT SERPL-MCNC: 0.98 MG/DL — SIGNIFICANT CHANGE UP (ref 0.5–1.3)
GLUCOSE SERPL-MCNC: 107 MG/DL — HIGH (ref 70–99)
HCT VFR BLD CALC: 33.2 % — LOW (ref 39–50)
HGB BLD-MCNC: 10.6 G/DL — LOW (ref 13–17)
MCHC RBC-ENTMCNC: 30.4 PG — SIGNIFICANT CHANGE UP (ref 27–34)
MCHC RBC-ENTMCNC: 31.9 GM/DL — LOW (ref 32–36)
MCV RBC AUTO: 95.1 FL — SIGNIFICANT CHANGE UP (ref 80–100)
NRBC # BLD: 0 /100 WBCS — SIGNIFICANT CHANGE UP (ref 0–0)
PLATELET # BLD AUTO: 158 K/UL — SIGNIFICANT CHANGE UP (ref 150–400)
POTASSIUM SERPL-MCNC: 4.5 MMOL/L — SIGNIFICANT CHANGE UP (ref 3.5–5.3)
POTASSIUM SERPL-SCNC: 4.5 MMOL/L — SIGNIFICANT CHANGE UP (ref 3.5–5.3)
RBC # BLD: 3.49 M/UL — LOW (ref 4.2–5.8)
RBC # FLD: 14.8 % — HIGH (ref 10.3–14.5)
SODIUM SERPL-SCNC: 140 MMOL/L — SIGNIFICANT CHANGE UP (ref 135–145)
WBC # BLD: 8.21 K/UL — SIGNIFICANT CHANGE UP (ref 3.8–10.5)
WBC # FLD AUTO: 8.21 K/UL — SIGNIFICANT CHANGE UP (ref 3.8–10.5)

## 2021-06-27 RX ORDER — HYDROMORPHONE HYDROCHLORIDE 2 MG/ML
2 INJECTION INTRAMUSCULAR; INTRAVENOUS; SUBCUTANEOUS EVERY 4 HOURS
Refills: 0 | Status: DISCONTINUED | OUTPATIENT
Start: 2021-06-27 | End: 2021-06-28

## 2021-06-27 RX ORDER — HYDROMORPHONE HYDROCHLORIDE 2 MG/ML
4 INJECTION INTRAMUSCULAR; INTRAVENOUS; SUBCUTANEOUS EVERY 4 HOURS
Refills: 0 | Status: DISCONTINUED | OUTPATIENT
Start: 2021-06-27 | End: 2021-06-28

## 2021-06-27 RX ORDER — METHOCARBAMOL 500 MG/1
500 TABLET, FILM COATED ORAL EVERY 8 HOURS
Refills: 0 | Status: DISCONTINUED | OUTPATIENT
Start: 2021-06-27 | End: 2021-06-28

## 2021-06-27 RX ADMIN — Medication 975 MILLIGRAM(S): at 21:48

## 2021-06-27 RX ADMIN — Medication 1 TABLET(S): at 12:26

## 2021-06-27 RX ADMIN — HYDROMORPHONE HYDROCHLORIDE 1 MILLIGRAM(S): 2 INJECTION INTRAMUSCULAR; INTRAVENOUS; SUBCUTANEOUS at 18:27

## 2021-06-27 RX ADMIN — Medication 15 MILLIGRAM(S): at 23:46

## 2021-06-27 RX ADMIN — Medication 975 MILLIGRAM(S): at 12:41

## 2021-06-27 RX ADMIN — HYDROMORPHONE HYDROCHLORIDE 4 MILLIGRAM(S): 2 INJECTION INTRAMUSCULAR; INTRAVENOUS; SUBCUTANEOUS at 12:27

## 2021-06-27 RX ADMIN — Medication 15 MILLIGRAM(S): at 06:45

## 2021-06-27 RX ADMIN — Medication 975 MILLIGRAM(S): at 21:18

## 2021-06-27 RX ADMIN — HYDROMORPHONE HYDROCHLORIDE 1 MILLIGRAM(S): 2 INJECTION INTRAMUSCULAR; INTRAVENOUS; SUBCUTANEOUS at 09:20

## 2021-06-27 RX ADMIN — SENNA PLUS 2 TABLET(S): 8.6 TABLET ORAL at 21:19

## 2021-06-27 RX ADMIN — HYDROMORPHONE HYDROCHLORIDE 4 MILLIGRAM(S): 2 INJECTION INTRAMUSCULAR; INTRAVENOUS; SUBCUTANEOUS at 16:45

## 2021-06-27 RX ADMIN — HYDROMORPHONE HYDROCHLORIDE 4 MILLIGRAM(S): 2 INJECTION INTRAMUSCULAR; INTRAVENOUS; SUBCUTANEOUS at 01:38

## 2021-06-27 RX ADMIN — Medication 975 MILLIGRAM(S): at 06:46

## 2021-06-27 RX ADMIN — HYDROMORPHONE HYDROCHLORIDE 4 MILLIGRAM(S): 2 INJECTION INTRAMUSCULAR; INTRAVENOUS; SUBCUTANEOUS at 17:00

## 2021-06-27 RX ADMIN — HYDROMORPHONE HYDROCHLORIDE 4 MILLIGRAM(S): 2 INJECTION INTRAMUSCULAR; INTRAVENOUS; SUBCUTANEOUS at 06:45

## 2021-06-27 RX ADMIN — HYDROMORPHONE HYDROCHLORIDE 4 MILLIGRAM(S): 2 INJECTION INTRAMUSCULAR; INTRAVENOUS; SUBCUTANEOUS at 12:57

## 2021-06-27 RX ADMIN — HYDROMORPHONE HYDROCHLORIDE 1 MILLIGRAM(S): 2 INJECTION INTRAMUSCULAR; INTRAVENOUS; SUBCUTANEOUS at 04:00

## 2021-06-27 RX ADMIN — Medication 975 MILLIGRAM(S): at 12:27

## 2021-06-27 RX ADMIN — HYDROMORPHONE HYDROCHLORIDE 1 MILLIGRAM(S): 2 INJECTION INTRAMUSCULAR; INTRAVENOUS; SUBCUTANEOUS at 22:30

## 2021-06-27 RX ADMIN — Medication 975 MILLIGRAM(S): at 06:16

## 2021-06-27 RX ADMIN — HYDROMORPHONE HYDROCHLORIDE 1 MILLIGRAM(S): 2 INJECTION INTRAMUSCULAR; INTRAVENOUS; SUBCUTANEOUS at 19:00

## 2021-06-27 RX ADMIN — HYDROMORPHONE HYDROCHLORIDE 4 MILLIGRAM(S): 2 INJECTION INTRAMUSCULAR; INTRAVENOUS; SUBCUTANEOUS at 21:19

## 2021-06-27 RX ADMIN — METHOCARBAMOL 500 MILLIGRAM(S): 500 TABLET, FILM COATED ORAL at 12:27

## 2021-06-27 RX ADMIN — CELECOXIB 200 MILLIGRAM(S): 200 CAPSULE ORAL at 06:16

## 2021-06-27 RX ADMIN — POLYETHYLENE GLYCOL 3350 17 GRAM(S): 17 POWDER, FOR SOLUTION ORAL at 21:19

## 2021-06-27 RX ADMIN — Medication 15 MILLIGRAM(S): at 00:13

## 2021-06-27 RX ADMIN — HYDROMORPHONE HYDROCHLORIDE 4 MILLIGRAM(S): 2 INJECTION INTRAMUSCULAR; INTRAVENOUS; SUBCUTANEOUS at 20:49

## 2021-06-27 RX ADMIN — HYDROMORPHONE HYDROCHLORIDE 1 MILLIGRAM(S): 2 INJECTION INTRAMUSCULAR; INTRAVENOUS; SUBCUTANEOUS at 00:13

## 2021-06-27 RX ADMIN — METHOCARBAMOL 500 MILLIGRAM(S): 500 TABLET, FILM COATED ORAL at 21:18

## 2021-06-27 RX ADMIN — HYDROMORPHONE HYDROCHLORIDE 1 MILLIGRAM(S): 2 INJECTION INTRAMUSCULAR; INTRAVENOUS; SUBCUTANEOUS at 23:00

## 2021-06-27 RX ADMIN — HYDROMORPHONE HYDROCHLORIDE 1 MILLIGRAM(S): 2 INJECTION INTRAMUSCULAR; INTRAVENOUS; SUBCUTANEOUS at 09:05

## 2021-06-27 RX ADMIN — HYDROMORPHONE HYDROCHLORIDE 1 MILLIGRAM(S): 2 INJECTION INTRAMUSCULAR; INTRAVENOUS; SUBCUTANEOUS at 05:00

## 2021-06-27 RX ADMIN — Medication 15 MILLIGRAM(S): at 06:15

## 2021-06-27 RX ADMIN — CELECOXIB 200 MILLIGRAM(S): 200 CAPSULE ORAL at 18:27

## 2021-06-27 RX ADMIN — CELECOXIB 200 MILLIGRAM(S): 200 CAPSULE ORAL at 18:36

## 2021-06-27 RX ADMIN — HYDROMORPHONE HYDROCHLORIDE 1 MILLIGRAM(S): 2 INJECTION INTRAMUSCULAR; INTRAVENOUS; SUBCUTANEOUS at 14:11

## 2021-06-27 RX ADMIN — APIXABAN 5 MILLIGRAM(S): 2.5 TABLET, FILM COATED ORAL at 06:20

## 2021-06-27 RX ADMIN — CELECOXIB 200 MILLIGRAM(S): 200 CAPSULE ORAL at 06:46

## 2021-06-27 RX ADMIN — METHOCARBAMOL 500 MILLIGRAM(S): 500 TABLET, FILM COATED ORAL at 06:16

## 2021-06-27 RX ADMIN — PANTOPRAZOLE SODIUM 40 MILLIGRAM(S): 20 TABLET, DELAYED RELEASE ORAL at 06:15

## 2021-06-27 RX ADMIN — HYDROMORPHONE HYDROCHLORIDE 4 MILLIGRAM(S): 2 INJECTION INTRAMUSCULAR; INTRAVENOUS; SUBCUTANEOUS at 06:17

## 2021-06-27 RX ADMIN — APIXABAN 5 MILLIGRAM(S): 2.5 TABLET, FILM COATED ORAL at 18:27

## 2021-06-27 RX ADMIN — HYDROMORPHONE HYDROCHLORIDE 4 MILLIGRAM(S): 2 INJECTION INTRAMUSCULAR; INTRAVENOUS; SUBCUTANEOUS at 01:08

## 2021-06-27 RX ADMIN — HYDROMORPHONE HYDROCHLORIDE 1 MILLIGRAM(S): 2 INJECTION INTRAMUSCULAR; INTRAVENOUS; SUBCUTANEOUS at 14:41

## 2021-06-27 NOTE — PROGRESS NOTE ADULT - SUBJECTIVE AND OBJECTIVE BOX
Ortho Note    Pt comfortable without complaints, pain controlled  Denies CP, SOB, N/V, numbness/tingling     Vital Signs Last 24 Hrs  T(C): 36.7 (06-27-21 @ 05:24), Max: 36.7 (06-27-21 @ 05:24)  T(F): 98.1 (06-27-21 @ 05:24), Max: 98.1 (06-27-21 @ 05:24)  HR: 59 (06-27-21 @ 05:24) (59 - 59)  BP: 126/65 (06-27-21 @ 05:24) (126/65 - 126/65)  BP(mean): --  RR: 18 (06-27-21 @ 05:24) (18 - 18)  SpO2: 100% (06-27-21 @ 05:24) (100% - 100%)    General: Pt Alert and oriented, NAD  L knee prevena intact with some serosanguinous output   Sensation intact s/s/sp/dp/t and symmetric   Motor: EHL/FHL/TA/GS 5/5 and symmetric   2+ DP pulse  Toes WWP        A/P: 35yMale s/p L TKA 6/25  - Stable  - Pain Control  - DVT ppx: ASA  - PT, WBS: WBAT in KI for few days  - Dispo home when clears PT     Ortho Pager [phone] Ortho Note    Pt comfortable without complaints, pain controlled  Denies CP, SOB, N/V, numbness/tingling     Vital Signs Last 24 Hrs  T(C): 36.7 (06-27-21 @ 05:24), Max: 36.7 (06-27-21 @ 05:24)  T(F): 98.1 (06-27-21 @ 05:24), Max: 98.1 (06-27-21 @ 05:24)  HR: 59 (06-27-21 @ 05:24) (59 - 59)  BP: 126/65 (06-27-21 @ 05:24) (126/65 - 126/65)  BP(mean): --  RR: 18 (06-27-21 @ 05:24) (18 - 18)  SpO2: 100% (06-27-21 @ 05:24) (100% - 100%)    General: Pt Alert and oriented, NAD  L knee prevena intact with some serosanguinous output   Sensation intact s/s/sp/dp/t and symmetric   Motor: EHL/FHL/TA/GS 5/5 and symmetric   2+ DP pulse  Toes WWP        A/P: 35yMale s/p L TKA 6/25  - Stable  - Pain Control  - DVT ppx: Eliquis   - PT, WBS: WBAT in KI for few days  - Dispo home when clears PT     Ortho Pager [phone]

## 2021-06-28 ENCOUNTER — TRANSCRIPTION ENCOUNTER (OUTPATIENT)
Age: 36
End: 2021-06-28

## 2021-06-28 VITALS
RESPIRATION RATE: 15 BRPM | SYSTOLIC BLOOD PRESSURE: 124 MMHG | DIASTOLIC BLOOD PRESSURE: 74 MMHG | OXYGEN SATURATION: 99 % | HEART RATE: 72 BPM

## 2021-06-28 LAB
ANION GAP SERPL CALC-SCNC: 9 MMOL/L — SIGNIFICANT CHANGE UP (ref 5–17)
BUN SERPL-MCNC: 9 MG/DL — SIGNIFICANT CHANGE UP (ref 7–23)
CALCIUM SERPL-MCNC: 9.2 MG/DL — SIGNIFICANT CHANGE UP (ref 8.4–10.5)
CHLORIDE SERPL-SCNC: 99 MMOL/L — SIGNIFICANT CHANGE UP (ref 96–108)
CO2 SERPL-SCNC: 31 MMOL/L — SIGNIFICANT CHANGE UP (ref 22–31)
CREAT SERPL-MCNC: 0.85 MG/DL — SIGNIFICANT CHANGE UP (ref 0.5–1.3)
GLUCOSE SERPL-MCNC: 106 MG/DL — HIGH (ref 70–99)
HCT VFR BLD CALC: 32.5 % — LOW (ref 39–50)
HGB BLD-MCNC: 10.6 G/DL — LOW (ref 13–17)
MCHC RBC-ENTMCNC: 30.4 PG — SIGNIFICANT CHANGE UP (ref 27–34)
MCHC RBC-ENTMCNC: 32.6 GM/DL — SIGNIFICANT CHANGE UP (ref 32–36)
MCV RBC AUTO: 93.1 FL — SIGNIFICANT CHANGE UP (ref 80–100)
NRBC # BLD: 0 /100 WBCS — SIGNIFICANT CHANGE UP (ref 0–0)
PLATELET # BLD AUTO: 165 K/UL — SIGNIFICANT CHANGE UP (ref 150–400)
POTASSIUM SERPL-MCNC: 4 MMOL/L — SIGNIFICANT CHANGE UP (ref 3.5–5.3)
POTASSIUM SERPL-SCNC: 4 MMOL/L — SIGNIFICANT CHANGE UP (ref 3.5–5.3)
RBC # BLD: 3.49 M/UL — LOW (ref 4.2–5.8)
RBC # FLD: 14.5 % — SIGNIFICANT CHANGE UP (ref 10.3–14.5)
SODIUM SERPL-SCNC: 139 MMOL/L — SIGNIFICANT CHANGE UP (ref 135–145)
WBC # BLD: 8.55 K/UL — SIGNIFICANT CHANGE UP (ref 3.8–10.5)
WBC # FLD AUTO: 8.55 K/UL — SIGNIFICANT CHANGE UP (ref 3.8–10.5)

## 2021-06-28 PROCEDURE — 86769 SARS-COV-2 COVID-19 ANTIBODY: CPT

## 2021-06-28 PROCEDURE — 97116 GAIT TRAINING THERAPY: CPT

## 2021-06-28 PROCEDURE — C1713: CPT

## 2021-06-28 PROCEDURE — 93970 EXTREMITY STUDY: CPT

## 2021-06-28 PROCEDURE — 73560 X-RAY EXAM OF KNEE 1 OR 2: CPT

## 2021-06-28 PROCEDURE — 87086 URINE CULTURE/COLONY COUNT: CPT

## 2021-06-28 PROCEDURE — 97110 THERAPEUTIC EXERCISES: CPT

## 2021-06-28 PROCEDURE — 97161 PT EVAL LOW COMPLEX 20 MIN: CPT

## 2021-06-28 PROCEDURE — 97530 THERAPEUTIC ACTIVITIES: CPT

## 2021-06-28 PROCEDURE — C1776: CPT

## 2021-06-28 PROCEDURE — 81001 URINALYSIS AUTO W/SCOPE: CPT

## 2021-06-28 PROCEDURE — 93970 EXTREMITY STUDY: CPT | Mod: 26

## 2021-06-28 PROCEDURE — 80048 BASIC METABOLIC PNL TOTAL CA: CPT

## 2021-06-28 PROCEDURE — 73564 X-RAY EXAM KNEE 4 OR MORE: CPT

## 2021-06-28 PROCEDURE — 85027 COMPLETE CBC AUTOMATED: CPT

## 2021-06-28 PROCEDURE — 36415 COLL VENOUS BLD VENIPUNCTURE: CPT

## 2021-06-28 RX ORDER — HYDROMORPHONE HYDROCHLORIDE 2 MG/ML
4 INJECTION INTRAMUSCULAR; INTRAVENOUS; SUBCUTANEOUS ONCE
Refills: 0 | Status: DISCONTINUED | OUTPATIENT
Start: 2021-06-28 | End: 2021-06-28

## 2021-06-28 RX ORDER — HYDROMORPHONE HYDROCHLORIDE 2 MG/ML
1 INJECTION INTRAMUSCULAR; INTRAVENOUS; SUBCUTANEOUS
Qty: 30 | Refills: 0
Start: 2021-06-28

## 2021-06-28 RX ADMIN — HYDROMORPHONE HYDROCHLORIDE 2 MILLIGRAM(S): 2 INJECTION INTRAMUSCULAR; INTRAVENOUS; SUBCUTANEOUS at 14:34

## 2021-06-28 RX ADMIN — Medication 975 MILLIGRAM(S): at 20:44

## 2021-06-28 RX ADMIN — HYDROMORPHONE HYDROCHLORIDE 4 MILLIGRAM(S): 2 INJECTION INTRAMUSCULAR; INTRAVENOUS; SUBCUTANEOUS at 21:01

## 2021-06-28 RX ADMIN — HYDROMORPHONE HYDROCHLORIDE 4 MILLIGRAM(S): 2 INJECTION INTRAMUSCULAR; INTRAVENOUS; SUBCUTANEOUS at 00:52

## 2021-06-28 RX ADMIN — APIXABAN 5 MILLIGRAM(S): 2.5 TABLET, FILM COATED ORAL at 17:05

## 2021-06-28 RX ADMIN — HYDROMORPHONE HYDROCHLORIDE 4 MILLIGRAM(S): 2 INJECTION INTRAMUSCULAR; INTRAVENOUS; SUBCUTANEOUS at 22:22

## 2021-06-28 RX ADMIN — CELECOXIB 200 MILLIGRAM(S): 200 CAPSULE ORAL at 17:05

## 2021-06-28 RX ADMIN — Medication 1 TABLET(S): at 11:08

## 2021-06-28 RX ADMIN — APIXABAN 5 MILLIGRAM(S): 2.5 TABLET, FILM COATED ORAL at 05:35

## 2021-06-28 RX ADMIN — CELECOXIB 200 MILLIGRAM(S): 200 CAPSULE ORAL at 05:27

## 2021-06-28 RX ADMIN — PANTOPRAZOLE SODIUM 40 MILLIGRAM(S): 20 TABLET, DELAYED RELEASE ORAL at 05:28

## 2021-06-28 RX ADMIN — SENNA PLUS 2 TABLET(S): 8.6 TABLET ORAL at 20:45

## 2021-06-28 RX ADMIN — CELECOXIB 200 MILLIGRAM(S): 200 CAPSULE ORAL at 05:57

## 2021-06-28 RX ADMIN — HYDROMORPHONE HYDROCHLORIDE 4 MILLIGRAM(S): 2 INJECTION INTRAMUSCULAR; INTRAVENOUS; SUBCUTANEOUS at 11:42

## 2021-06-28 RX ADMIN — HYDROMORPHONE HYDROCHLORIDE 2 MILLIGRAM(S): 2 INJECTION INTRAMUSCULAR; INTRAVENOUS; SUBCUTANEOUS at 18:58

## 2021-06-28 RX ADMIN — CELECOXIB 200 MILLIGRAM(S): 200 CAPSULE ORAL at 17:45

## 2021-06-28 RX ADMIN — METHOCARBAMOL 500 MILLIGRAM(S): 500 TABLET, FILM COATED ORAL at 14:00

## 2021-06-28 RX ADMIN — HYDROMORPHONE HYDROCHLORIDE 2 MILLIGRAM(S): 2 INJECTION INTRAMUSCULAR; INTRAVENOUS; SUBCUTANEOUS at 19:38

## 2021-06-28 RX ADMIN — Medication 975 MILLIGRAM(S): at 05:31

## 2021-06-28 RX ADMIN — HYDROMORPHONE HYDROCHLORIDE 4 MILLIGRAM(S): 2 INJECTION INTRAMUSCULAR; INTRAVENOUS; SUBCUTANEOUS at 12:22

## 2021-06-28 RX ADMIN — HYDROMORPHONE HYDROCHLORIDE 4 MILLIGRAM(S): 2 INJECTION INTRAMUSCULAR; INTRAVENOUS; SUBCUTANEOUS at 01:22

## 2021-06-28 RX ADMIN — Medication 975 MILLIGRAM(S): at 14:40

## 2021-06-28 RX ADMIN — HYDROMORPHONE HYDROCHLORIDE 4 MILLIGRAM(S): 2 INJECTION INTRAMUSCULAR; INTRAVENOUS; SUBCUTANEOUS at 16:33

## 2021-06-28 RX ADMIN — Medication 975 MILLIGRAM(S): at 06:01

## 2021-06-28 RX ADMIN — Medication 15 MILLIGRAM(S): at 00:16

## 2021-06-28 RX ADMIN — METHOCARBAMOL 500 MILLIGRAM(S): 500 TABLET, FILM COATED ORAL at 20:44

## 2021-06-28 RX ADMIN — Medication 975 MILLIGRAM(S): at 14:00

## 2021-06-28 RX ADMIN — METHOCARBAMOL 500 MILLIGRAM(S): 500 TABLET, FILM COATED ORAL at 05:28

## 2021-06-28 RX ADMIN — Medication 975 MILLIGRAM(S): at 22:00

## 2021-06-28 RX ADMIN — HYDROMORPHONE HYDROCHLORIDE 4 MILLIGRAM(S): 2 INJECTION INTRAMUSCULAR; INTRAVENOUS; SUBCUTANEOUS at 07:31

## 2021-06-28 RX ADMIN — HYDROMORPHONE HYDROCHLORIDE 2 MILLIGRAM(S): 2 INJECTION INTRAMUSCULAR; INTRAVENOUS; SUBCUTANEOUS at 15:20

## 2021-06-28 RX ADMIN — HYDROMORPHONE HYDROCHLORIDE 4 MILLIGRAM(S): 2 INJECTION INTRAMUSCULAR; INTRAVENOUS; SUBCUTANEOUS at 15:53

## 2021-06-28 NOTE — PROGRESS NOTE ADULT - SUBJECTIVE AND OBJECTIVE BOX
Ortho Note    Pt comfortable without complaints, pain controlled  Denies CP, SOB, N/V, numbness/tingling     Vital Signs Last 24 Hrs  T(C): 36.7 (06-28-21 @ 05:12), Max: 36.7 (06-28-21 @ 05:12)  T(F): 98 (06-28-21 @ 05:12), Max: 98 (06-28-21 @ 05:12)  HR: 68 (06-28-21 @ 05:12) (68 - 68)  BP: 147/74 (06-28-21 @ 05:12) (147/74 - 147/74)  BP(mean): --  RR: 18 (06-28-21 @ 05:12) (18 - 18)  SpO2: 100% (06-28-21 @ 05:12) (100% - 100%)  AVSS    General: Pt Alert and oriented, NAD  L knee prevena intact with some serosanguinous output   Sensation intact s/s/sp/dp/t and symmetric   Motor: EHL/FHL/TA/GS 5/5 and symmetric   2+ DP pulse  Toes WWP      A/P: 35yMale s/p L TKA 6/25  - Stable  - Pain Control  - DVT ppx: ASA  - PT, WBS: WBAT in KI for few days  - Dispo home today when clears stairs with PT     Ortho Pager [phone] Ortho Note    Pt comfortable without complaints, pain controlled  Denies CP, SOB, N/V, numbness/tingling     Vital Signs Last 24 Hrs  T(C): 36.7 (06-28-21 @ 05:12), Max: 36.7 (06-28-21 @ 05:12)  T(F): 98 (06-28-21 @ 05:12), Max: 98 (06-28-21 @ 05:12)  HR: 68 (06-28-21 @ 05:12) (68 - 68)  BP: 147/74 (06-28-21 @ 05:12) (147/74 - 147/74)  BP(mean): --  RR: 18 (06-28-21 @ 05:12) (18 - 18)  SpO2: 100% (06-28-21 @ 05:12) (100% - 100%)  AVSS    General: Pt Alert and oriented, NAD  L knee prevena intact with some serosanguinous output   Sensation intact s/s/sp/dp/t and symmetric   Motor: EHL/FHL/TA/GS 5/5 and symmetric   2+ DP pulse  Toes WWP      A/P: 35yMale s/p L TKA 6/25  - Stable  - Pain Control  - DVT ppx: Eliquis  - PT, WBS: WBAT in KI for few days  - Dispo home today when clears stairs with PT     Ortho Pager [phone]

## 2021-06-28 NOTE — DISCHARGE NOTE NURSING/CASE MANAGEMENT/SOCIAL WORK - PATIENT PORTAL LINK FT
You can access the FollowMyHealth Patient Portal offered by [facility] by registering at the following website: [email or web address]. By joining [facility]’s FollowMyHealth portal, you will also be able to view your health information using other applications (apps) compatible with our system.

## 2021-06-28 NOTE — PROGRESS NOTE ADULT - ATTENDING COMMENTS
Pt. seen/ examined  Pain well-controlled  Labs/ vitals reviewed  Planning D/C home today  Discussed follow up, dressing care

## 2021-06-28 NOTE — PROGRESS NOTE ADULT - SUBJECTIVE AND OBJECTIVE BOX
POST OPERATIVE DAY # 3 left TKA   SUBJECTIVE: Patient seen and examined.  Pt without complaints. Feeling well this AM   Denies chest pain/SOB/dizziness/n/v/HA   Pain well controlled.       OBJECTIVE:     Vital Signs Last 24 Hrs  T(C): 36.7 (28 Jun 2021 12:21), Max: 37.2 (27 Jun 2021 18:22)  T(F): 98.1 (28 Jun 2021 12:21), Max: 99 (27 Jun 2021 18:22)  HR: 67 (28 Jun 2021 12:21) (67 - 93)  BP: 137/62 (28 Jun 2021 12:21) (130/44 - 152/87)  BP(mean): 89 (28 Jun 2021 12:21) (89 - 110)  RR: 16 (28 Jun 2021 12:21) (16 - 18)  SpO2: 97% (28 Jun 2021 12:21) (95% - 100%)    PE:  Comfortable, pleasant, lying in bed after PT          Dressing: clean/dry/intact , KI in place, prevena with suction         Sensation: intact to light touch to patient's baseline BLE          Motor exam:  firing ehl/ta/gs/fhl 5/5 BLE          Skin warm, well-perfused; capillary refill brisk             LABS:                        10.6   8.55  )-----------( 165      ( 28 Jun 2021 06:10 )             32.5     06-28    139  |  99  |  9   ----------------------------<  106<H>  4.0   |  31  |  0.85    Ca    9.2      28 Jun 2021 06:10      ASSESSMENT AND PLAN: POD 3 left TKA doing well  1. Stable. Labs reviewed. Prevena in place. Knee immobilizer in place.   2. Analgesic pain control  3. DVT prophylaxis: home Eliquis. Will get doppler BLE today to r/o DVT as pt has hx of unprovoked PE   4. Weight Bearing Status:   WBAT in knee immobilizer   5. Disposition: Home after doppler if negative

## 2021-07-02 DIAGNOSIS — I10 ESSENTIAL (PRIMARY) HYPERTENSION: ICD-10-CM

## 2021-07-02 DIAGNOSIS — M92.519 JUVENILE OSTEOCHONDROSIS OF PROXIMAL TIBIA, UNSPECIFIED LEG: ICD-10-CM

## 2021-07-02 DIAGNOSIS — F17.211 NICOTINE DEPENDENCE, CIGARETTES, IN REMISSION: ICD-10-CM

## 2021-07-02 DIAGNOSIS — F12.90 CANNABIS USE, UNSPECIFIED, UNCOMPLICATED: ICD-10-CM

## 2021-07-02 DIAGNOSIS — M17.12 UNILATERAL PRIMARY OSTEOARTHRITIS, LEFT KNEE: ICD-10-CM

## 2021-07-02 DIAGNOSIS — E66.9 OBESITY, UNSPECIFIED: ICD-10-CM

## 2021-07-02 DIAGNOSIS — Z87.891 PERSONAL HISTORY OF NICOTINE DEPENDENCE: ICD-10-CM

## 2021-07-02 DIAGNOSIS — Z86.711 PERSONAL HISTORY OF PULMONARY EMBOLISM: ICD-10-CM

## 2021-07-02 DIAGNOSIS — Z79.01 LONG TERM (CURRENT) USE OF ANTICOAGULANTS: ICD-10-CM

## 2021-09-07 ENCOUNTER — APPOINTMENT (OUTPATIENT)
Dept: DISASTER EMERGENCY | Facility: CLINIC | Age: 36
End: 2021-09-07

## 2021-09-07 ENCOUNTER — LABORATORY RESULT (OUTPATIENT)
Age: 36
End: 2021-09-07

## 2022-03-18 NOTE — ED ADULT TRIAGE NOTE - NS ED TRIAGE AVPU SCALE
[No Acute Distress] : no acute distress [Well Nourished] : well nourished [Well Developed] : well developed [Well-Appearing] : well-appearing Alert-The patient is alert, awake and responds to voice. The patient is oriented to time, place, and person. The triage nurse is able to obtain subjective information. [Normal Sclera/Conjunctiva] : normal sclera/conjunctiva [PERRL] : pupils equal round and reactive to light [EOMI] : extraocular movements intact [Normal Outer Ear/Nose] : the outer ears and nose were normal in appearance [Normal Oropharynx] : the oropharynx was normal [No JVD] : no jugular venous distention [No Lymphadenopathy] : no lymphadenopathy [Supple] : supple [Thyroid Normal, No Nodules] : the thyroid was normal and there were no nodules present [No Respiratory Distress] : no respiratory distress  [No Accessory Muscle Use] : no accessory muscle use [Clear to Auscultation] : lungs were clear to auscultation bilaterally [Normal Rate] : normal rate  [Regular Rhythm] : with a regular rhythm [Normal S1, S2] : normal S1 and S2 [No Murmur] : no murmur heard [No Carotid Bruits] : no carotid bruits [No Abdominal Bruit] : a ~M bruit was not heard ~T in the abdomen [No Varicosities] : no varicosities [Pedal Pulses Present] : the pedal pulses are present [No Edema] : there was no peripheral edema [No Palpable Aorta] : no palpable aorta [No Extremity Clubbing/Cyanosis] : no extremity clubbing/cyanosis [Soft] : abdomen soft [Non Tender] : non-tender [Non-distended] : non-distended [No Masses] : no abdominal mass palpated [No HSM] : no HSM [Normal Bowel Sounds] : normal bowel sounds [Normal Posterior Cervical Nodes] : no posterior cervical lymphadenopathy [Normal Anterior Cervical Nodes] : no anterior cervical lymphadenopathy [No CVA Tenderness] : no CVA  tenderness [No Spinal Tenderness] : no spinal tenderness [No Joint Swelling] : no joint swelling [Grossly Normal Strength/Tone] : grossly normal strength/tone [No Rash] : no rash [Coordination Grossly Intact] : coordination grossly intact [No Focal Deficits] : no focal deficits [Normal Gait] : normal gait [Deep Tendon Reflexes (DTR)] : deep tendon reflexes were 2+ and symmetric [Normal Affect] : the affect was normal [Normal Insight/Judgement] : insight and judgment were intact

## 2022-09-20 NOTE — ED PROVIDER NOTE - CROS ED GI ALL NEG
[facility] Transitions Initial Follow Up Call    Outreach made within 2 business days of discharge: Yes    Patient: Kate Reno   Patient : 1945 MRN: [id number]    Reason for Admission: Chills and weakness    Discharge Date: 2022      Discharge Diagnoses: Active Hospital Problems   Diagnosis    **Acute pyelonephritis    Bacteremia due to Escherichia coli    BPH with urinary obstruction    Aortic ectasia, thoracic (HCC)    Current use of long term anticoagulation    Class 1 obesity in adult    CAROLEE on CPAP    PAF (paroxysmal atrial fibrillation) ([facility])    Essential hypertension      Spoke with: Patient    Discharge department/facility: [address] [place] [address] Interactive Patient Contact:  Was patient able to fill all prescriptions: Yes  Was patient instructed to bring all medications to the follow-up visit: Yes  Is patient taking all medications as directed in the discharge summary? Yes  Does patient understand their discharge instructions: Yes  Does patient have questions or concerns that need addressed prior to 7-14 day follow up office visit: no    Spoke to the patient he is doing better. He is on oral antibiotics and not having any issues with it at this time. He is eating fine and drinking lots of water. I let him know to make sure since he is having all the antibiotics to get some yogurt to help build back up his good bacteria. Pt VU. PT did not need anything at this time and says he is doing really well now.  Pt is scheduled for 22    Scheduled appointment with PCP within 7-14 days    Follow Up  Future Appointments   Date Time Provider Cj Mattson   2022 11:30 AM [facility]   10/3/2022  9:30 AM MD MITZI Gómez [facility]   10/18/2022  9:15 AM [facility]   2/10/2023  9:15 AM Marisabel Conte MD N [facility]       Jael Daniel MA - - -

## 2022-12-14 NOTE — ED ADULT NURSE NOTE - NS ED NURSE LEVEL OF CONSCIOUSNESS AFFECT
Calm Suturegard Body: The suture ends were repeatedly re-tightened and re-clamped to achieve the desired tissue expansion.

## 2024-03-07 NOTE — PRE-OP CHECKLIST - IV STARTED
Refill of Alprazolam sent in. Metronidazole pill form 500mg TID for 10 days was sent in as well.   yes

## 2024-12-09 NOTE — ED ADULT TRIAGE NOTE - NS ED TRIAGE AVPU SCALE
10YM c/c of NVD Pt mother state that pt been having NVD since 8am this morning    Alert-The patient is alert, awake and responds to voice. The patient is oriented to time, place, and person. The triage nurse is able to obtain subjective information.